# Patient Record
Sex: FEMALE | Race: BLACK OR AFRICAN AMERICAN | NOT HISPANIC OR LATINO | Employment: OTHER | URBAN - METROPOLITAN AREA
[De-identification: names, ages, dates, MRNs, and addresses within clinical notes are randomized per-mention and may not be internally consistent; named-entity substitution may affect disease eponyms.]

---

## 2017-04-21 ENCOUNTER — ALLSCRIPTS OFFICE VISIT (OUTPATIENT)
Dept: OTHER | Facility: OTHER | Age: 57
End: 2017-04-21

## 2017-04-21 DIAGNOSIS — M81.0 AGE-RELATED OSTEOPOROSIS WITHOUT CURRENT PATHOLOGICAL FRACTURE: ICD-10-CM

## 2017-04-21 DIAGNOSIS — I10 ESSENTIAL (PRIMARY) HYPERTENSION: ICD-10-CM

## 2017-04-21 DIAGNOSIS — Z12.39 ENCOUNTER FOR OTHER SCREENING FOR MALIGNANT NEOPLASM OF BREAST: ICD-10-CM

## 2017-04-21 DIAGNOSIS — M25.562 PAIN IN LEFT KNEE: ICD-10-CM

## 2017-04-21 DIAGNOSIS — E66.9 OBESITY: ICD-10-CM

## 2017-04-21 DIAGNOSIS — Z12.11 ENCOUNTER FOR SCREENING FOR MALIGNANT NEOPLASM OF COLON: ICD-10-CM

## 2017-04-21 DIAGNOSIS — R60.0 LOCALIZED EDEMA: ICD-10-CM

## 2017-04-21 DIAGNOSIS — R73.01 IMPAIRED FASTING GLUCOSE: ICD-10-CM

## 2017-04-24 ENCOUNTER — HOSPITAL ENCOUNTER (OUTPATIENT)
Dept: NON INVASIVE DIAGNOSTICS | Facility: CLINIC | Age: 57
Discharge: HOME | End: 2017-04-24
Payer: COMMERCIAL

## 2017-04-24 DIAGNOSIS — M25.562 PAIN IN LEFT KNEE: ICD-10-CM

## 2017-04-24 DIAGNOSIS — R60.0 LOCALIZED EDEMA: ICD-10-CM

## 2017-04-24 PROCEDURE — 93971 EXTREMITY STUDY: CPT

## 2017-04-26 ENCOUNTER — GENERIC CONVERSION - ENCOUNTER (OUTPATIENT)
Dept: OTHER | Facility: OTHER | Age: 57
End: 2017-04-26

## 2018-01-14 VITALS
HEART RATE: 96 BPM | WEIGHT: 250 LBS | HEIGHT: 66 IN | BODY MASS INDEX: 40.18 KG/M2 | DIASTOLIC BLOOD PRESSURE: 76 MMHG | OXYGEN SATURATION: 97 % | SYSTOLIC BLOOD PRESSURE: 132 MMHG

## 2018-02-27 ENCOUNTER — OFFICE VISIT (OUTPATIENT)
Dept: FAMILY MEDICINE CLINIC | Facility: CLINIC | Age: 58
End: 2018-02-27
Payer: COMMERCIAL

## 2018-02-27 VITALS
HEIGHT: 65 IN | DIASTOLIC BLOOD PRESSURE: 74 MMHG | OXYGEN SATURATION: 93 % | TEMPERATURE: 99.4 F | SYSTOLIC BLOOD PRESSURE: 140 MMHG | RESPIRATION RATE: 16 BRPM | WEIGHT: 242 LBS | BODY MASS INDEX: 40.32 KG/M2 | HEART RATE: 97 BPM

## 2018-02-27 DIAGNOSIS — Z12.31 ENCOUNTER FOR SCREENING MAMMOGRAM FOR BREAST CANCER: ICD-10-CM

## 2018-02-27 DIAGNOSIS — Z12.11 SCREENING FOR COLON CANCER: Primary | ICD-10-CM

## 2018-02-27 DIAGNOSIS — Z13.820 SCREENING FOR OSTEOPOROSIS: ICD-10-CM

## 2018-02-27 DIAGNOSIS — Z00.00 WELL ADULT EXAM: ICD-10-CM

## 2018-02-27 DIAGNOSIS — J01.10 ACUTE FRONTAL SINUSITIS, RECURRENCE NOT SPECIFIED: Primary | ICD-10-CM

## 2018-02-27 PROCEDURE — 99396 PREV VISIT EST AGE 40-64: CPT | Performed by: FAMILY MEDICINE

## 2018-02-27 RX ORDER — AMLODIPINE BESYLATE AND BENAZEPRIL HYDROCHLORIDE 5; 40 MG/1; MG/1
1 CAPSULE ORAL DAILY
COMMUNITY
Start: 2017-03-23 | End: 2018-05-05 | Stop reason: SDUPTHER

## 2018-02-27 RX ORDER — AZITHROMYCIN 250 MG/1
TABLET, FILM COATED ORAL
Qty: 6 TABLET | Refills: 0 | Status: SHIPPED | OUTPATIENT
Start: 2018-02-27 | End: 2018-03-03

## 2018-02-27 RX ORDER — HYDROCHLOROTHIAZIDE 12.5 MG/1
1 TABLET ORAL DAILY
COMMUNITY
Start: 2017-05-16 | End: 2018-05-25 | Stop reason: SDUPTHER

## 2018-02-27 NOTE — PROGRESS NOTES
Standard Visit   Assessment/Plan:     Visit Diagnosis:  Diagnoses and all orders for this visit:    Screening for colon cancer  -     Cologuard    Encounter for screening mammogram for breast cancer  -     Mammo screening bilateral w cad; Future    Screening for osteoporosis  -     DXA bone density spine hip and pelvis; Future    Well adult exam    Other orders  -     amLODIPine-benazepril (LOTREL) 5-40 MG per capsule; Take 1 capsule by mouth daily  -     hydrochlorothiazide (HYDRODIURIL) 12 5 mg tablet; Take 1 tablet by mouth Daily     physical form completed   Mammo and DEXA have been ordered  [place] guard has been ordered        Subjective:    Patient ID: Geraldo Slade is a 62 y o  female  Patient is here with the following concerns:    Here for a work physical   Jonathan Bernabe is here for a  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    this is a yearly state requirement   a PPD is no longer required   gyn appointment was about 4 years ago  mammo is overdue,  DEXA is overdue   no colonoscopy has been done, there is no colon cancer in her family   she is a candidate for the Cologuard    unsure of when her  Last Adacel shot was -  She does have babies under a year old in her         The following portions of the patient's history were reviewed and updated as appropriate: allergies, current medications, past family history, past medical history, past social history, past surgical history and problem list     Review of Systems   Constitutional: Negative for activity change, fatigue, fever and unexpected weight change  HENT: Negative for congestion, ear pain, hearing loss, sinus pain, sore throat, tinnitus, trouble swallowing and voice change  Eyes: Negative for pain, discharge and visual disturbance  Respiratory: Negative for cough, chest tightness, shortness of breath and wheezing  Cardiovascular: Negative for chest pain, palpitations and leg swelling     Gastrointestinal: Negative for abdominal pain, blood in stool, diarrhea and vomiting  Endocrine: Negative for cold intolerance, heat intolerance and polyuria  Genitourinary: Negative for difficulty urinating, dysuria, flank pain, genital sores and urgency  Musculoskeletal: Negative for gait problem, joint swelling and neck stiffness  Skin: Negative for color change, rash and wound  Allergic/Immunologic: Negative for immunocompromised state  Neurological: Negative for syncope, speech difficulty and light-headedness  Psychiatric/Behavioral: Negative for behavioral problems, dysphoric mood and suicidal ideas           /74   Pulse 97   Temp 99 4 °F (37 4 °C)   Resp 16   Ht 5' 5" (1 651 m)   Wt 110 kg (242 lb)   SpO2 93%   BMI 40 27 kg/m²   Social History     Social History    Marital status: Unknown     Spouse name: N/A    Number of children: N/A    Years of education: N/A     Occupational History    Self Employed      Social History Main Topics    Smoking status: Former Smoker    Smokeless tobacco: Never Used    Alcohol use No    Drug use: No    Sexual activity: Not on file     Other Topics Concern    Not on file     Social History Narrative    Employed    Lives alone with help available    Never uses seat belt    Self Employed     Past Medical History:   Diagnosis Date    Essential hypertension     Resolved:4/21/2017     Family History   Problem Relation Age of Onset    Cancer Father      malignant neoplasm    Diabetes Cousin     Asthma Other      acute    Hypertension Other     Cancer Other      malignant neoplasm    Cancer Other      malignant neoplasm    Thyroid disease Family      Past Surgical History:   Procedure Laterality Date    NO PAST SURGERIES         Current Outpatient Prescriptions:     amLODIPine-benazepril (LOTREL) 5-40 MG per capsule, Take 1 capsule by mouth daily, Disp: , Rfl:     hydrochlorothiazide (HYDRODIURIL) 12 5 mg tablet, Take 1 tablet by mouth Daily, Disp: , Rfl:       Objective: Physical Exam   Constitutional: She is oriented to person, place, and time  She appears well-developed and well-nourished  HENT:   Head: Normocephalic and atraumatic  Eyes: Pupils are equal, round, and reactive to light  Neck: Normal range of motion  Neck supple  Cardiovascular: Normal rate and normal heart sounds  No murmur heard  Pulmonary/Chest: Effort normal and breath sounds normal  No respiratory distress  She exhibits no tenderness  Abdominal: Soft  Bowel sounds are normal    Musculoskeletal: Normal range of motion  She exhibits no tenderness  Lymphadenopathy:     She has no cervical adenopathy  Neurological: She is alert and oriented to person, place, and time  Coordination normal    Skin: Skin is warm and dry  Psychiatric: She has a normal mood and affect  Thought content normal    Nursing note and vitals reviewed        Social History     Social History    Marital status: Unknown     Spouse name: N/A    Number of children: N/A    Years of education: N/A     Occupational History    Self Employed      Social History Main Topics    Smoking status: Former Smoker    Smokeless tobacco: Never Used    Alcohol use No    Drug use: No    Sexual activity: Not on file     Other Topics Concern    Not on file     Social History Narrative    Employed    Lives alone with help available    Never uses seat belt    Self Employed     Past Medical History:   Diagnosis Date    Essential hypertension     Resolved:4/21/2017       Current Outpatient Prescriptions:     amLODIPine-benazepril (LOTREL) 5-40 MG per capsule, Take 1 capsule by mouth daily, Disp: , Rfl:     hydrochlorothiazide (HYDRODIURIL) 12 5 mg tablet, Take 1 tablet by mouth Daily, Disp: , Rfl:   Family History   Problem Relation Age of Onset    Cancer Father      malignant neoplasm    Diabetes Cousin     Asthma Other      acute    Hypertension Other     Cancer Other      malignant neoplasm    Cancer Other      malignant neoplasm  Thyroid disease Family        There are no Patient Instructions on file for this visit          CLEMENTINE Holliday

## 2018-03-19 ENCOUNTER — TELEPHONE (OUTPATIENT)
Dept: FAMILY MEDICINE CLINIC | Facility: CLINIC | Age: 58
End: 2018-03-19

## 2018-03-19 NOTE — TELEPHONE ENCOUNTER
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

## 2018-03-26 NOTE — TELEPHONE ENCOUNTER
Pt called back  States she did not go to an urgent care  She took a lot of Cranberry juice and her UTI symptoms improved on their own  She still have sinus pain but is getting better  I advised pt to call back to schedule an appt if her UTI symptoms comes back  She agreed

## 2018-05-05 DIAGNOSIS — I10 ESSENTIAL HYPERTENSION: Primary | ICD-10-CM

## 2018-05-05 RX ORDER — AMLODIPINE BESYLATE AND BENAZEPRIL HYDROCHLORIDE 5; 40 MG/1; MG/1
CAPSULE ORAL
Qty: 90 CAPSULE | Refills: 1 | Status: SHIPPED | OUTPATIENT
Start: 2018-05-05 | End: 2018-11-27 | Stop reason: SDUPTHER

## 2018-05-25 DIAGNOSIS — R60.9 EDEMA, UNSPECIFIED TYPE: Primary | ICD-10-CM

## 2018-05-25 RX ORDER — HYDROCHLOROTHIAZIDE 12.5 MG/1
TABLET ORAL
Qty: 90 TABLET | Refills: 1 | Status: SHIPPED | OUTPATIENT
Start: 2018-05-25 | End: 2018-12-18 | Stop reason: SDUPTHER

## 2018-11-22 DIAGNOSIS — I10 ESSENTIAL HYPERTENSION: ICD-10-CM

## 2018-11-22 RX ORDER — AMLODIPINE BESYLATE AND BENAZEPRIL HYDROCHLORIDE 5; 40 MG/1; MG/1
CAPSULE ORAL
Qty: 90 CAPSULE | Refills: 1 | Status: CANCELLED | OUTPATIENT
Start: 2018-11-22

## 2018-11-27 DIAGNOSIS — I10 ESSENTIAL HYPERTENSION: ICD-10-CM

## 2018-11-27 RX ORDER — AMLODIPINE BESYLATE AND BENAZEPRIL HYDROCHLORIDE 5; 40 MG/1; MG/1
1 CAPSULE ORAL DAILY
Qty: 90 CAPSULE | Refills: 1 | Status: SHIPPED | OUTPATIENT
Start: 2018-11-27 | End: 2019-06-03 | Stop reason: SDUPTHER

## 2018-12-17 DIAGNOSIS — R60.9 EDEMA, UNSPECIFIED TYPE: ICD-10-CM

## 2018-12-18 RX ORDER — HYDROCHLOROTHIAZIDE 12.5 MG/1
TABLET ORAL
Qty: 90 TABLET | Refills: 1 | Status: SHIPPED | OUTPATIENT
Start: 2018-12-18 | End: 2019-06-14 | Stop reason: SDUPTHER

## 2019-03-06 ENCOUNTER — OFFICE VISIT (OUTPATIENT)
Dept: FAMILY MEDICINE CLINIC | Facility: CLINIC | Age: 59
End: 2019-03-06
Payer: COMMERCIAL

## 2019-03-06 VITALS
DIASTOLIC BLOOD PRESSURE: 74 MMHG | WEIGHT: 242 LBS | HEIGHT: 66 IN | BODY MASS INDEX: 38.89 KG/M2 | SYSTOLIC BLOOD PRESSURE: 128 MMHG | OXYGEN SATURATION: 99 % | HEART RATE: 76 BPM

## 2019-03-06 DIAGNOSIS — Z12.31 ENCOUNTER FOR SCREENING MAMMOGRAM FOR BREAST CANCER: ICD-10-CM

## 2019-03-06 DIAGNOSIS — Z12.11 SCREENING FOR COLON CANCER: Primary | ICD-10-CM

## 2019-03-06 DIAGNOSIS — I10 ESSENTIAL HYPERTENSION: ICD-10-CM

## 2019-03-06 DIAGNOSIS — R53.82 CHRONIC FATIGUE: ICD-10-CM

## 2019-03-06 DIAGNOSIS — E78.2 MIXED HYPERLIPIDEMIA: ICD-10-CM

## 2019-03-06 DIAGNOSIS — R63.5 WEIGHT GAIN: ICD-10-CM

## 2019-03-06 DIAGNOSIS — M81.0 AGE-RELATED OSTEOPOROSIS WITHOUT CURRENT PATHOLOGICAL FRACTURE: ICD-10-CM

## 2019-03-06 DIAGNOSIS — Z11.59 NEED FOR HEPATITIS C SCREENING TEST: ICD-10-CM

## 2019-03-06 PROCEDURE — 99396 PREV VISIT EST AGE 40-64: CPT | Performed by: FAMILY MEDICINE

## 2019-03-06 NOTE — PROGRESS NOTES
Standard Visit   Assessment/Plan:     Visit Diagnosis:  Diagnoses and all orders for this visit:    Screening for colon cancer  -     Cologuard; Future    Essential hypertension  -     Microalbumin / creatinine urine ratio  -     TSH, 3rd generation with Free T4 reflex; Future    Mixed hyperlipidemia  -     Lipid panel; Future    Chronic fatigue  -     TSH, 3rd generation with Free T4 reflex; Future  -     Comprehensive metabolic panel; Future  -     CBC and differential; Future    Need for hepatitis C screening test  -     Hepatitis C antibody; Future    Encounter for screening mammogram for breast cancer  -     Mammo screening bilateral w cad; Future    Age-related osteoporosis without current pathological fracture  -     DXA bone density spine hip and pelvis; Future    Weight gain  -     Lorcaserin HCl 10 MG TABS; Take 1 tablet (10 mg total) by mouth once for 1 dose          Subjective:    Patient ID: Deborah Gee is a 62 y o  female  Patient is here with the following concerns:    Marly Felder is here for her  physical  She is treated for hypertension with Lotrel and hydrochlorothiazide  She is overdue for her lab work and mammo and DEXA  She is also overdue for her colon guard test which we will order today  She is also keen on some weight loss  She did very well on Belviq  So we are going to talk about some of our her options today        The following portions of the patient's history were reviewed and updated as appropriate: allergies, current medications, past family history, past medical history, past social history, past surgical history and problem list     Review of Systems   Constitutional: Positive for activity change and unexpected weight change  Negative for fever  HENT: Negative for nosebleeds and trouble swallowing  Eyes: Negative  Respiratory: Negative  Cardiovascular: Negative for palpitations  Gastrointestinal: Negative for abdominal pain, blood in stool and vomiting  Endocrine: Negative for cold intolerance  Genitourinary: Negative  Musculoskeletal: Negative for neck stiffness  Skin: Negative for pallor  Allergic/Immunologic: Negative for immunocompromised state  Neurological: Negative for seizures and facial asymmetry  Hematological: Negative for adenopathy  Psychiatric/Behavioral: Negative for agitation and suicidal ideas           /74   Pulse 76   Ht 5' 6" (1 676 m)   Wt 110 kg (242 lb)   SpO2 99%   BMI 39 06 kg/m²   Social History     Socioeconomic History    Marital status: Unknown     Spouse name: Not on file    Number of children: Not on file    Years of education: Not on file    Highest education level: Not on file   Occupational History    Occupation: Self Employed   Social Needs    Financial resource strain: Not on file    Food insecurity:     Worry: Not on file     Inability: Not on file    Transportation needs:     Medical: Not on file     Non-medical: Not on file   Tobacco Use    Smoking status: Former Smoker    Smokeless tobacco: Never Used   Substance and Sexual Activity    Alcohol use: No    Drug use: No    Sexual activity: Not on file   Lifestyle    Physical activity:     Days per week: Not on file     Minutes per session: Not on file    Stress: Not on file   Relationships    Social connections:     Talks on phone: Not on file     Gets together: Not on file     Attends Sikhism service: Not on file     Active member of club or organization: Not on file     Attends meetings of clubs or organizations: Not on file     Relationship status: Not on file    Intimate partner violence:     Fear of current or ex partner: Not on file     Emotionally abused: Not on file     Physically abused: Not on file     Forced sexual activity: Not on file   Other Topics Concern    Not on file   Social History Narrative    Employed    Lives alone with help available    Never uses seat belt    Self Employed     Past Medical History: Diagnosis Date    Essential hypertension     Resolved:4/21/2017     Family History   Problem Relation Age of Onset    Cancer Father         malignant neoplasm    Diabetes Cousin     Asthma Other         acute    Hypertension Other     Cancer Other         malignant neoplasm    Cancer Other         malignant neoplasm    Thyroid disease Family      Past Surgical History:   Procedure Laterality Date    NO PAST SURGERIES         Current Outpatient Medications:     amLODIPine-benazepril (LOTREL) 5-40 MG per capsule, Take 1 capsule by mouth daily, Disp: 90 capsule, Rfl: 1    hydrochlorothiazide (HYDRODIURIL) 12 5 mg tablet, TAKE 1 TABLET BY MOUTH EVERY DAY IN THE MORNING, Disp: 90 tablet, Rfl: 1    Lorcaserin HCl 10 MG TABS, Take 1 tablet (10 mg total) by mouth once for 1 dose, Disp: 30 tablet, Rfl: 3      Objective:     Physical Exam   Constitutional: She is oriented to person, place, and time  She appears well-developed and well-nourished  HENT:   Head: Normocephalic and atraumatic  Eyes: Pupils are equal, round, and reactive to light  Neck: Normal range of motion  Neck supple  Cardiovascular: Normal rate  Pulmonary/Chest: Effort normal and breath sounds normal    Abdominal: Soft  Musculoskeletal: Normal range of motion  Neurological: She is alert and oriented to person, place, and time  Skin: Skin is warm and dry  Psychiatric: She has a normal mood and affect  Nursing note and vitals reviewed        Social History     Socioeconomic History    Marital status: Unknown     Spouse name: Not on file    Number of children: Not on file    Years of education: Not on file    Highest education level: Not on file   Occupational History    Occupation: Self Employed   Social Needs    Financial resource strain: Not on file    Food insecurity:     Worry: Not on file     Inability: Not on file    Transportation needs:     Medical: Not on file     Non-medical: Not on file   Tobacco Use    Smoking status: Former Smoker    Smokeless tobacco: Never Used   Substance and Sexual Activity    Alcohol use: No    Drug use: No    Sexual activity: Not on file   Lifestyle    Physical activity:     Days per week: Not on file     Minutes per session: Not on file    Stress: Not on file   Relationships    Social connections:     Talks on phone: Not on file     Gets together: Not on file     Attends Confucianist service: Not on file     Active member of club or organization: Not on file     Attends meetings of clubs or organizations: Not on file     Relationship status: Not on file    Intimate partner violence:     Fear of current or ex partner: Not on file     Emotionally abused: Not on file     Physically abused: Not on file     Forced sexual activity: Not on file   Other Topics Concern    Not on file   Social History Narrative    Employed    Lives alone with help available    Never uses seat belt    Self Employed     Past Medical History:   Diagnosis Date    Essential hypertension     Resolved:4/21/2017       Current Outpatient Medications:     amLODIPine-benazepril (LOTREL) 5-40 MG per capsule, Take 1 capsule by mouth daily, Disp: 90 capsule, Rfl: 1    hydrochlorothiazide (HYDRODIURIL) 12 5 mg tablet, TAKE 1 TABLET BY MOUTH EVERY DAY IN THE MORNING, Disp: 90 tablet, Rfl: 1    Lorcaserin HCl 10 MG TABS, Take 1 tablet (10 mg total) by mouth once for 1 dose, Disp: 30 tablet, Rfl: 3  Family History   Problem Relation Age of Onset    Cancer Father         malignant neoplasm    Diabetes Cousin     Asthma Other         acute    Hypertension Other     Cancer Other         malignant neoplasm    Cancer Other         malignant neoplasm    Thyroid disease Family        There are no Patient Instructions on file for this visit          CLEMENTINE Durand

## 2019-03-08 ENCOUNTER — TELEPHONE (OUTPATIENT)
Dept: FAMILY MEDICINE CLINIC | Facility: CLINIC | Age: 59
End: 2019-03-08

## 2019-03-08 DIAGNOSIS — R63.5 WEIGHT GAIN: ICD-10-CM

## 2019-03-08 NOTE — TELEPHONE ENCOUNTER
Script was accidentally printed  It can be shredded  I did forward it on to her pharmacy  Please give her a call

## 2019-03-11 NOTE — TELEPHONE ENCOUNTER
Called and spoke with pt, informed her that Rx was sent in again with the correction but she is having some insurance issue covering the medication  She stated that she will contact the office back with up dates  5

## 2019-03-17 ENCOUNTER — HOSPITAL ENCOUNTER (EMERGENCY)
Facility: HOSPITAL | Age: 59
Discharge: HOME | End: 2019-03-17
Attending: EMERGENCY MEDICINE | Admitting: EMERGENCY MEDICINE
Payer: COMMERCIAL

## 2019-03-17 ENCOUNTER — APPOINTMENT (EMERGENCY)
Dept: CT IMAGING | Facility: HOSPITAL | Age: 59
End: 2019-03-17
Payer: COMMERCIAL

## 2019-03-17 VITALS
HEART RATE: 82 BPM | DIASTOLIC BLOOD PRESSURE: 66 MMHG | OXYGEN SATURATION: 97 % | WEIGHT: 250 LBS | TEMPERATURE: 97.9 F | BODY MASS INDEX: 40.35 KG/M2 | SYSTOLIC BLOOD PRESSURE: 128 MMHG | RESPIRATION RATE: 20 BRPM

## 2019-03-17 DIAGNOSIS — N20.1 RIGHT URETERAL STONE: ICD-10-CM

## 2019-03-17 DIAGNOSIS — R10.9 RIGHT FLANK PAIN: Primary | ICD-10-CM

## 2019-03-17 LAB
ALBUMIN SERPL BCP-MCNC: 3.3 G/DL (ref 3.5–5)
ALP SERPL-CCNC: 73 U/L (ref 46–116)
ALT SERPL W P-5'-P-CCNC: 23 U/L (ref 12–78)
ANION GAP SERPL CALCULATED.3IONS-SCNC: 7 MMOL/L (ref 4–13)
AST SERPL W P-5'-P-CCNC: 13 U/L (ref 5–45)
BACTERIA UR QL AUTO: ABNORMAL /HPF
BASOPHILS # BLD AUTO: 0.03 THOUSANDS/ΜL (ref 0–0.1)
BASOPHILS NFR BLD AUTO: 0 % (ref 0–1)
BILIRUB DIRECT SERPL-MCNC: 0.07 MG/DL (ref 0–0.2)
BILIRUB SERPL-MCNC: 0.3 MG/DL (ref 0.2–1)
BILIRUB UR QL STRIP: NEGATIVE
BUN SERPL-MCNC: 11 MG/DL (ref 5–25)
CALCIUM SERPL-MCNC: 9.1 MG/DL (ref 8.3–10.1)
CHLORIDE SERPL-SCNC: 103 MMOL/L (ref 100–108)
CLARITY UR: CLEAR
CO2 SERPL-SCNC: 29 MMOL/L (ref 21–32)
COLOR UR: YELLOW
CREAT SERPL-MCNC: 0.87 MG/DL (ref 0.6–1.3)
EOSINOPHIL # BLD AUTO: 0.15 THOUSAND/ΜL (ref 0–0.61)
EOSINOPHIL NFR BLD AUTO: 2 % (ref 0–6)
ERYTHROCYTE [DISTWIDTH] IN BLOOD BY AUTOMATED COUNT: 13.1 % (ref 11.6–15.1)
GFR SERPL CREATININE-BSD FRML MDRD: 85 ML/MIN/1.73SQ M
GLUCOSE SERPL-MCNC: 155 MG/DL (ref 65–140)
GLUCOSE UR STRIP-MCNC: NEGATIVE MG/DL
HCT VFR BLD AUTO: 40.1 % (ref 34.8–46.1)
HGB BLD-MCNC: 13 G/DL (ref 11.5–15.4)
HGB UR QL STRIP.AUTO: NEGATIVE
IMM GRANULOCYTES # BLD AUTO: 0.03 THOUSAND/UL (ref 0–0.2)
IMM GRANULOCYTES NFR BLD AUTO: 0 % (ref 0–2)
KETONES UR STRIP-MCNC: NEGATIVE MG/DL
LEUKOCYTE ESTERASE UR QL STRIP: NEGATIVE
LIPASE SERPL-CCNC: 94 U/L (ref 73–393)
LYMPHOCYTES # BLD AUTO: 2.6 THOUSANDS/ΜL (ref 0.6–4.47)
LYMPHOCYTES NFR BLD AUTO: 37 % (ref 14–44)
MCH RBC QN AUTO: 29.7 PG (ref 26.8–34.3)
MCHC RBC AUTO-ENTMCNC: 32.4 G/DL (ref 31.4–37.4)
MCV RBC AUTO: 92 FL (ref 82–98)
MONOCYTES # BLD AUTO: 0.46 THOUSAND/ΜL (ref 0.17–1.22)
MONOCYTES NFR BLD AUTO: 7 % (ref 4–12)
NEUTROPHILS # BLD AUTO: 3.83 THOUSANDS/ΜL (ref 1.85–7.62)
NEUTS SEG NFR BLD AUTO: 54 % (ref 43–75)
NITRITE UR QL STRIP: POSITIVE
NON-SQ EPI CELLS URNS QL MICRO: ABNORMAL /HPF
NRBC BLD AUTO-RTO: 0 /100 WBCS
PH UR STRIP.AUTO: 5.5 [PH]
PLATELET # BLD AUTO: 287 THOUSANDS/UL (ref 149–390)
PMV BLD AUTO: 9.9 FL (ref 8.9–12.7)
POTASSIUM SERPL-SCNC: 3.5 MMOL/L (ref 3.5–5.3)
PROT SERPL-MCNC: 7.7 G/DL (ref 6.4–8.2)
PROT UR STRIP-MCNC: NEGATIVE MG/DL
RBC # BLD AUTO: 4.37 MILLION/UL (ref 3.81–5.12)
RBC #/AREA URNS AUTO: ABNORMAL /HPF
SODIUM SERPL-SCNC: 139 MMOL/L (ref 136–145)
SP GR UR STRIP.AUTO: >=1.03 (ref 1–1.03)
UROBILINOGEN UR QL STRIP.AUTO: 0.2 E.U./DL
WBC # BLD AUTO: 7.1 THOUSAND/UL (ref 4.31–10.16)
WBC #/AREA URNS AUTO: ABNORMAL /HPF

## 2019-03-17 PROCEDURE — 87086 URINE CULTURE/COLONY COUNT: CPT | Performed by: EMERGENCY MEDICINE

## 2019-03-17 PROCEDURE — 96374 THER/PROPH/DIAG INJ IV PUSH: CPT

## 2019-03-17 PROCEDURE — 81001 URINALYSIS AUTO W/SCOPE: CPT | Performed by: EMERGENCY MEDICINE

## 2019-03-17 PROCEDURE — 85025 COMPLETE CBC W/AUTO DIFF WBC: CPT | Performed by: EMERGENCY MEDICINE

## 2019-03-17 PROCEDURE — 36415 COLL VENOUS BLD VENIPUNCTURE: CPT | Performed by: EMERGENCY MEDICINE

## 2019-03-17 PROCEDURE — 71275 CT ANGIOGRAPHY CHEST: CPT

## 2019-03-17 PROCEDURE — 80048 BASIC METABOLIC PNL TOTAL CA: CPT | Performed by: EMERGENCY MEDICINE

## 2019-03-17 PROCEDURE — 87077 CULTURE AEROBIC IDENTIFY: CPT | Performed by: EMERGENCY MEDICINE

## 2019-03-17 PROCEDURE — 87186 SC STD MICRODIL/AGAR DIL: CPT | Performed by: EMERGENCY MEDICINE

## 2019-03-17 PROCEDURE — 83690 ASSAY OF LIPASE: CPT | Performed by: EMERGENCY MEDICINE

## 2019-03-17 PROCEDURE — 96361 HYDRATE IV INFUSION ADD-ON: CPT

## 2019-03-17 PROCEDURE — 99284 EMERGENCY DEPT VISIT MOD MDM: CPT

## 2019-03-17 PROCEDURE — 74177 CT ABD & PELVIS W/CONTRAST: CPT

## 2019-03-17 PROCEDURE — 80076 HEPATIC FUNCTION PANEL: CPT | Performed by: EMERGENCY MEDICINE

## 2019-03-17 RX ORDER — CEPHALEXIN 500 MG/1
500 CAPSULE ORAL EVERY 8 HOURS SCHEDULED
Qty: 21 CAPSULE | Refills: 0 | Status: SHIPPED | OUTPATIENT
Start: 2019-03-17 | End: 2019-03-24

## 2019-03-17 RX ORDER — TAMSULOSIN HYDROCHLORIDE 0.4 MG/1
0.4 CAPSULE ORAL
Qty: 7 CAPSULE | Refills: 0 | Status: SHIPPED | OUTPATIENT
Start: 2019-03-17 | End: 2020-05-08

## 2019-03-17 RX ORDER — NAPROXEN 500 MG/1
500 TABLET ORAL EVERY 12 HOURS PRN
Qty: 20 TABLET | Refills: 0 | Status: SHIPPED | OUTPATIENT
Start: 2019-03-17 | End: 2020-05-08

## 2019-03-17 RX ORDER — OXYCODONE HYDROCHLORIDE AND ACETAMINOPHEN 5; 325 MG/1; MG/1
1 TABLET ORAL EVERY 4 HOURS PRN
Qty: 12 TABLET | Refills: 0 | Status: SHIPPED | OUTPATIENT
Start: 2019-03-17 | End: 2019-03-27

## 2019-03-17 RX ORDER — KETOROLAC TROMETHAMINE 30 MG/ML
15 INJECTION, SOLUTION INTRAMUSCULAR; INTRAVENOUS ONCE
Status: COMPLETED | OUTPATIENT
Start: 2019-03-17 | End: 2019-03-17

## 2019-03-17 RX ORDER — LIDOCAINE 50 MG/G
1 PATCH TOPICAL ONCE
Status: DISCONTINUED | OUTPATIENT
Start: 2019-03-17 | End: 2019-03-17 | Stop reason: HOSPADM

## 2019-03-17 RX ADMIN — IOHEXOL 100 ML: 350 INJECTION, SOLUTION INTRAVENOUS at 13:19

## 2019-03-17 RX ADMIN — LIDOCAINE 1 PATCH: 50 PATCH TOPICAL at 11:52

## 2019-03-17 RX ADMIN — KETOROLAC TROMETHAMINE 15 MG: 30 INJECTION, SOLUTION INTRAMUSCULAR at 11:53

## 2019-03-17 RX ADMIN — SODIUM CHLORIDE 1000 ML: 0.9 INJECTION, SOLUTION INTRAVENOUS at 11:51

## 2019-03-17 NOTE — ED NOTES
Discharge instructions reviewed with patient  Pt verbalized understanding and denied any questions at this time  Pt given supplies to collect kidney stone       Bakari Chung RN  03/17/19 [id number]

## 2019-03-17 NOTE — DISCHARGE INSTRUCTIONS
Ureteral Stones   WHAT YOU NEED TO KNOW:   A ureteral stone is a stone that forms in the kidney and moves down the ureter and gets stuck there  The ureter is the tube that takes urine from the kidney to the bladder  Stones can form in the urinary system when your urine has high levels of minerals and salts  Urinary stones can be made of uric acid, calcium, phosphate, or oxalate crystals  DISCHARGE INSTRUCTIONS:   Seek care immediately if:   · You have severe pain that does not improve, even after you take medicine  · You have vomiting that is not relieved by medicine  Contact your healthcare provider if:   · You develop a fever  · You have any questions or concerns about your condition or care  Follow up with your healthcare provider as directed: You may need to return for more tests  Write down your questions so you remember to ask them during your visits  Medicines: You may need any of the following:  · NSAIDs , such as ibuprofen, help decrease swelling, pain, and fever  This medicine is available with or without a doctor's order  NSAIDs can cause stomach bleeding or kidney problems in certain people  If you take blood thinner medicine, always ask your healthcare provider if NSAIDs are safe for you  Always read the medicine label and follow directions  · Prescription pain medicine  may help decrease pain or help your ureteral stone pass  Do not wait until the pain is severe before you take pain medicine  · Nausea medicine  may help calm your stomach and prevent vomiting  · Take your medicine as directed  Contact your healthcare provider if you think your medicine is not helping or if you have side effects  Tell him or her if you are allergic to any medicine  Keep a list of the medicines, vitamins, and herbs you take  Include the amounts, and when and why you take them  Bring the list or the pill bottles to follow-up visits   Carry your medicine list with you in case of an emergency  Self-care:   · Drink plenty of liquids  Your healthcare provider may tell you to drink at least 8 to 12 (eight-ounce) cups of liquids each day  This helps flush out the ureteral stones when you urinate  Water is the best liquid to drink  · Strain your urine every time you go to the bathroom  Urinate through a strainer or a piece of thin cloth to catch the stones  Take the stones to your healthcare provider so they can be sent to the lab for tests  This will help your healthcare providers plan the best treatment for you  · Ask your healthcare provider about any nutrition changes you need to make  You may need to limit certain foods such as foods high in sodium (salt), certain protein foods, or foods high in oxalate  After you pass your ureteral stone: Once you have passed your ureteral stone, you may need to do a 24-hour urine test  You may need to save all of your urine for 24 hours  Each time you go to the bathroom, you will urinate into a container  Then you will pour your urine into a larger container that is kept cold  You may be told to write down the time and amount of urine you passed  At the end of 24 hours, the urine is sent to a lab for tests  Results from the test will help your healthcare provider plan ways to prevent more stones from forming  © 2017 [facility] Information is for End User's use only and may not be sold, redistributed or otherwise used for commercial purposes  All illustrations and images included in CareNotes® are the copyrighted property of A D [employer] M [employer]  or [employer]  The above information is an  only  It is not intended as medical advice for individual conditions or treatments  Talk to your doctor, nurse or pharmacist before following any medical regimen to see if it is safe and effective for you        How to Strain Your Urine   WHAT YOU NEED TO KNOW:   Urinate into a strainer (funnel with a fine mesh on the bottom) or glass jar to collect kidney stones  DISCHARGE INSTRUCTIONS:   Medicines:   · Pain medicine: You may be given medicine to take away or decrease pain  Do not wait until the pain is severe before you take your medicine  · NSAIDs:  These medicines decrease swelling, pain, and fever  NSAIDs are available without a doctor's order  Ask which medicine is right for you  Ask how much to take and when to take it  Take as directed  NSAIDs can cause stomach bleeding and kidney problems if not taken correctly  · Nausea medicine: This medicine calms your stomach and prevents or controls vomiting  · Take your medicine as directed  Contact your healthcare provider if you think your medicine is not helping or if you have side effects  Tell him of her if you are allergic to any medicine  Keep a list of the medicines, vitamins, and herbs you take  Include the amounts, and when and why you take them  Bring the list or the pill bottles to follow-up visits  Carry your medicine list with you in case of an emergency  Drink liquids as directed:  Drink about 3 liters of liquids each day, or as directed  That equals about 12 glasses of water or fruit juice  Half of your total daily liquids should be water  Limit coffee, tea, and soda to 2 cups daily  Your urine will be pale and clear if you are drinking enough liquid  Self-care:   · Activity:  Exercise, such as walking, may help decrease your pain  · Avoid heat:  Heat may cause you to sweat, urinate less, and become dehydrated  Follow up with your healthcare provider or urologist as directed:  Write down your questions so you remember to ask them during your visits  Contact your healthcare provider or urologist if:   · You have a fever and chills  · Your urine looks cloudy or has a bad smell  · You have burning pain when you urinate  · You have trouble urinating      · You are vomiting and it does not get better, even after you take medicine  · You have questions or concerns about your condition or care  Return to the emergency department if:   · You are not able to urinate  · You have severe pain in your lower abdomen or side  · Your heart flutters or beats faster than usual   © 2017 [employer] [facility] [employer] Information is for End User's use only and may not be sold, redistributed or otherwise used for commercial purposes  All illustrations and images included in CareNotes® are the copyrighted property of [employer] D [employer]  or [employer]en [employer]  The above information is an  only  It is not intended as medical advice for individual conditions or treatments  Talk to your doctor, nurse or pharmacist before following any medical regimen to see if it is safe and effective for you

## 2019-03-17 NOTE — ED PROVIDER NOTES
History  Chief Complaint   Patient presents with    Flank Pain     Pt  reports right side pain that radiates into back x2 weeks  Pt  reports worsening with movement  Patient is a 60-year-old female with past medical history of hypertension, surgical history of tubal ligation and D and C, presents to the emergency department complaining of right flank pain for the past 2 weeks  Patient reports she has had pain in the right side and right flank region it is been fairly constant and started about 2 weeks ago  The pain initially was mild but does wax and wane in severity  She describes it as both a sharp pain and sometimes a constant ache  She states it is worsened when she takes a deep breath and also with certain movements especially picking up heavy objects  She denies any recent injury, fall, MVC or picking something up and having acute pain at that time  She denies ever having this type of pain before  She has tried taking ibuprofen which does alleviate the pain but it returns  She also tried icy Hot  She denies any associated symptoms  No fever, shaking chills, headaches, dizziness or near syncope, visual disturbance or eye pain, recent URI symptoms, cough or hemoptysis, chest pain, palpitations, dyspnea, wheezing, abdominal pain, nausea, vomiting, diarrhea, constipation, blood per rectum or melena, dysuria, change in urinary frequency, hematuria, skin rash or color change, extremity weakness or paresthesia or other focal neurologic deficits  History provided by:  Patient   used: No    Flank Pain   Associated symptoms: no chest pain, no chills, no constipation, no cough, no diarrhea, no dysuria, no fever, no hematuria, no nausea, no shortness of breath, no sore throat and no vomiting        Prior to Admission Medications   Prescriptions Last Dose Informant Patient Reported? Taking?    amLODIPine-benazepril (LOTREL) 5-40 MG per capsule   No No   Sig: Take 1 capsule by mouth daily   hydrochlorothiazide (HYDRODIURIL) 12 5 mg tablet   No No   Sig: TAKE 1 TABLET BY MOUTH EVERY DAY IN THE MORNING      Facility-Administered Medications: None       Past Medical History:   Diagnosis Date    Essential hypertension     Resolved:4/21/2017       Past Surgical History:   Procedure Laterality Date    DILATION AND CURETTAGE OF UTERUS      NO PAST SURGERIES      TUBAL LIGATION         Family History   Problem Relation Age of Onset    Cancer Father         malignant neoplasm    Diabetes Cousin     Asthma Other         acute    Hypertension Other     Cancer Other         malignant neoplasm    Cancer Other         malignant neoplasm    Thyroid disease Family      I have reviewed and agree with the history as documented  Social History     Tobacco Use    Smoking status: Former Smoker    Smokeless tobacco: Never Used   Substance Use Topics    Alcohol use: No    Drug use: No        Review of Systems   Constitutional: Negative for chills and fever  HENT: Negative for congestion, ear pain, rhinorrhea and sore throat  Eyes: Negative for pain and visual disturbance  Respiratory: Negative for cough, chest tightness, shortness of breath and wheezing  Cardiovascular: Negative for chest pain and palpitations  Gastrointestinal: Negative for abdominal distention, abdominal pain, blood in stool, constipation, diarrhea, nausea and vomiting  Genitourinary: Positive for flank pain  Negative for dysuria, frequency, hematuria and pelvic pain  Musculoskeletal: Negative for back pain, neck pain and neck stiffness  Skin: Negative for color change, pallor and rash  Allergic/Immunologic: Negative for immunocompromised state  Neurological: Negative for dizziness, syncope, weakness, light-headedness, numbness and headaches  Hematological: Negative for adenopathy  Psychiatric/Behavioral: Negative for confusion and decreased concentration     All other systems reviewed and are negative  Physical Exam  Physical Exam   Constitutional: She is oriented to person, place, and time  She appears well-developed and well-nourished  No distress  HENT:   Head: Normocephalic and atraumatic  Mouth/Throat: Oropharynx is clear and moist  No oropharyngeal exudate  Eyes: Pupils are equal, round, and reactive to light  Conjunctivae and EOM are normal    Neck: Normal range of motion  Neck supple  No JVD present  Cardiovascular: Normal rate, regular rhythm, normal heart sounds and intact distal pulses  Exam reveals no gallop and no friction rub  No murmur heard  Pulmonary/Chest: Effort normal and breath sounds normal  No respiratory distress  She has no wheezes  She has no rales  She exhibits no tenderness  Abdominal: Soft  Bowel sounds are normal  She exhibits no distension  There is no tenderness  There is no rebound and no guarding  +Right CVA tenderness  Musculoskeletal: Normal range of motion  She exhibits no edema or tenderness  No midline spine tenderness  Neurological: She is alert and oriented to person, place, and time  No gross motor or sensory deficits  5/5 strength throughout  Skin: Skin is warm and dry  No rash noted  She is not diaphoretic  No erythema  No pallor  Psychiatric: She has a normal mood and affect  Her behavior is normal    Nursing note and vitals reviewed        Vital Signs  ED Triage Vitals [03/17/19 1106]   Temperature Pulse Respirations Blood Pressure SpO2   97 9 °F (36 6 °C) 89 18 129/74 97 %      Temp Source Heart Rate Source Patient Position - Orthostatic VS BP Location FiO2 (%)   Oral Monitor Sitting Right arm --      Pain Score       5         Vitals:    03/17/19 1300 03/17/19 1330 03/17/19 1400 03/17/19 1430   BP: 116/67 124/69 134/67 128/66   BP Location: Left arm Left arm Left arm Left arm   Pulse: 80 78 78 82   Resp: 16 18 18 20   Temp:       TempSrc:       SpO2: 94% 98% 100% 97%   Weight:           qSOFA     Row Name 03/17/19 1430 03/17/19 1400 03/17/19 1330 03/17/19 1300 03/17/19 1230    Altered mental status GCS < 15  --  --  --  --  --    Respiratory Rate > / =22  0  0  0  0  0    Systolic BP < / =680  0  0  0  0  0    Q Sofa Score  0  0  0  0  0    Row Name 03/17/19 1200 03/17/19 1106             Altered mental status GCS < 15  --  --       Respiratory Rate > / =22  0  0       Systolic BP < / =723  0  0       Q Sofa Score  0  0             Visual Acuity      ED Medications  Medications   lidocaine (LIDODERM) 5 % patch 1 patch (1 patch Topical Medication Applied 3/17/19 1152)   sodium chloride 0 9 % bolus 1,000 mL (1,000 mL Intravenous New Bag 3/17/19 1151)   ketorolac (TORADOL) injection 15 mg (15 mg Intravenous Given 3/17/19 1153)   iohexol (OMNIPAQUE) 350 MG/ML injection (MULTI-DOSE) 100 mL (100 mL Intravenous Given 3/17/19 1319)       Diagnostic Studies  Results Reviewed     Procedure Component Value Units Date/Time    Urine culture [[id number]] Collected:  03/17/19 1428    Lab Status: In process Specimen:  Urine Updated:  03/17/19 8469    Basic metabolic panel [[id number]]  (Abnormal) Collected:  03/17/19 1150    Lab Status:  Final result Specimen:  Blood from Arm, Right Updated:  03/17/19 1215     Sodium 139 mmol/L      Potassium 3 5 mmol/L      Chloride 103 mmol/L      CO2 29 mmol/L      ANION GAP 7 mmol/L      BUN 11 mg/dL      Creatinine 0 87 mg/dL      Glucose 155 mg/dL      Calcium 9 1 mg/dL      eGFR 85 ml/min/1 73sq m     Narrative:       National Kidney Disease Education Program recommendations are as follows:  GFR calculation is accurate only with a steady state creatinine  Chronic Kidney disease less than 60 ml/min/1 73 sq  meters  Kidney failure less than 15 ml/min/1 73 sq  meters      Hepatic function panel [[id number]]  (Abnormal) Collected:  03/17/19 1150    Lab Status:  Final result Specimen:  Blood from Arm, Right Updated:  03/17/19 1215     Total Bilirubin 0 30 mg/dL      Bilirubin, Direct 0 07 mg/dL      Alkaline Phosphatase 73 U/L      AST 13 U/L      ALT 23 U/L      Total Protein 7 7 g/dL      Albumin 3 3 g/dL     Lipase [[id number]]  (Normal) Collected:  03/17/19 1150    Lab Status:  Final result Specimen:  Blood from Arm, Right Updated:  03/17/19 1215     Lipase 94 u/L     Urine Microscopic [[id number]]  (Abnormal) Collected:  03/17/19 1150    Lab Status:  Final result Specimen:  Urine, Clean Catch Updated:  03/17/19 1213     RBC, UA 2-4 /hpf      WBC, UA 2-4 /hpf      Epithelial Cells Moderate /hpf      Bacteria, UA Moderate /hpf     CBC and differential [[id number]] Collected:  03/17/19 1150    Lab Status:  Final result Specimen:  Blood from Arm, Right Updated:  03/17/19 1156     WBC 7 10 Thousand/uL      RBC 4 37 Million/uL      Hemoglobin 13 0 g/dL      Hematocrit 40 1 %      MCV 92 fL      MCH 29 7 pg      MCHC 32 4 g/dL      RDW 13 1 %      MPV 9 9 fL      Platelets 395 Thousands/uL      nRBC 0 /100 WBCs      Neutrophils Relative 54 %      Immat GRANS % 0 %      Lymphocytes Relative 37 %      Monocytes Relative 7 %      Eosinophils Relative 2 %      Basophils Relative 0 %      Neutrophils Absolute 3 83 Thousands/µL      Immature Grans Absolute 0 03 Thousand/uL      Lymphocytes Absolute 2 60 Thousands/µL      Monocytes Absolute 0 46 Thousand/µL      Eosinophils Absolute 0 15 Thousand/µL      Basophils Absolute 0 03 Thousands/µL     UA w Reflex to Microscopic [[id number]]  (Abnormal) Collected:  03/17/19 1150    Lab Status:  Final result Specimen:  Urine, Clean Catch Updated:  03/17/19 1156     Color, UA Yellow     Clarity, UA Clear     Specific Gravity, UA >=1 030     pH, UA 5 5     Leukocytes, UA Negative     Nitrite, UA Positive     Protein, UA Negative mg/dl      Glucose, UA Negative mg/dl      Ketones, UA Negative mg/dl      Urobilinogen, UA 0 2 E U /dl      Bilirubin, UA Negative     Blood, UA Negative                 PE Study with CT Abdomen and Pelvis with contrast   Final Result by Beryle Panda, MD (03/17 1409)      1  No evidence of pulmonary embolism or other acute intrathoracic abnormality  2   2 mm calculus at the right UVJ with associated mild hydroureter  No hydronephrosis  The study was marked in [facility] for immediate notification  Workstation performed: [id number]                    Procedures  Procedures       Phone Contacts  ED Phone Contact    ED Course  ED Course as of Mar 17 1437   Gia Waddell Mar 17, 2019   1207 Will consider treating for pyelo given flank pain/tenderness  Nitrite, UA(!): Positive   1246 Bacteria, UA(!): Moderate   1246 WBC, UA(!): 2-4   1433 Updated patient about significant CT findings of a 2 mm right UVJ stone which is the likely culprit of her symptoms  Will prescribe analgesics to go home with as well as Flomax  Will also start her on Keflex given nitrite positive urinalysis which could be a false positive from blood but will still cover with a course of Keflex  Advised her to return immediately if her pain worsens or is uncontrolled at home, she develops new or intractable vomiting, new fevers, urinary symptoms suggesting UTI or urinary retention  Will give referral for Urology  MDM  Number of Diagnoses or Management Options  Diagnosis management comments: 70-year-old female presents with right flank pain for the past 2 weeks, worsened by certain movements, bending down and with deep inhalation  Differential includes musculoskeletal strain or spasm, biliary colic, cholecystitis, renal colic secondary to stone, UTI or pyelonephritis, possible appendicitis, diverticulitis, pulmonary embolism, right lower lobe pneumonia  Will check labs, UA and obtain a PE study and CT abdomen and pelvis  Will give IV fluids, Lidoderm patch and Toradol for symptomatic relief         Amount and/or Complexity of Data Reviewed  Clinical lab tests: ordered and reviewed  Tests in the radiology section of CPT®: ordered and reviewed  Independent visualization of images, tracings, or specimens: yes        Disposition  Final diagnoses:   Right flank pain   Right ureteral stone     Time reflects when diagnosis was documented in both MDM as applicable and the Disposition within this note     Time User Action Codes Description Comment    3/17/2019  2:34 PM Kristen TORRES Add [R10 9] Right flank pain     3/17/2019  2:34 PM Kristen TORRES Add [N20 1] Right ureteral stone       ED Disposition     ED Disposition Condition Date/Time Comment    Discharge Stable Sun Mar 17, 2019  2:34 PM Hailey Cesar discharge to home/self care              Follow-up Information     Follow up With Specialties Details Why Contact Info Additional Information    Jorgito Brooks, [address] [facility], Nurse Practitioner Schedule an appointment as soon as possible for a visit   [phone]       575 [facility]rology [facility]rology Schedule an appointment as soon as possible for a visit   [address]  1121 [facility] [address]  701  [facility], [address] [facility],  [address], [place]  22  Emergency Department Emergency Medicine Go to  If symptoms worsen, view of worsening or uncontrolled pain, new or intractable nausea or vomiting, fever, shaking chills, dysuria or change in urinary frequency [address] ED, [address] [place], [place], [address]          Patient's Medications   Discharge Prescriptions    CEPHALEXIN (KEFLEX) 500 MG CAPSULE    Take 1 capsule (500 mg total) by mouth every 8 (eight) hours for 7 days       Start Date: 3/17/2019 End Date: 3/24/2019       Order Dose: 500 mg       Quantity: 21 capsule    Refills: 0    NAPROXEN (NAPROSYN) 500 MG TABLET    Take 1 tablet (500 mg total) by mouth every 12 (twelve) hours as needed for mild pain or moderate pain       Start Date: 3/17/2019 End Date: --       Order Dose: 500 mg       Quantity: 20 tablet    Refills: 0    OXYCODONE-ACETAMINOPHEN (PERCOCET) 5-325 MG PER TABLET    Take 1 tablet by mouth every 4 (four) hours as needed for severe pain for up to 10 daysMax Daily Amount: 6 tablets       Start Date: 3/17/2019 End Date: 3/27/2019       Order Dose: 1 tablet       Quantity: 12 tablet    Refills: 0    TAMSULOSIN (FLOMAX) 0 4 MG    Take 1 capsule (0 4 mg total) by mouth daily with dinner       Start Date: 3/17/2019 End Date: --       Order Dose: 0 4 mg       Quantity: 7 capsule    Refills: 0     No discharge procedures on file      ED Provider  Electronically Signed by           Karina Rebolledo DO  03/17/19 2815

## 2019-03-20 LAB — BACTERIA UR CULT: ABNORMAL

## 2019-03-21 ENCOUNTER — TELEPHONE (OUTPATIENT)
Dept: EMERGENCY DEPT | Facility: HOSPITAL | Age: 59
End: 2019-03-21

## 2019-03-21 DIAGNOSIS — N39.0 URINARY TRACT INFECTION WITHOUT HEMATURIA, SITE UNSPECIFIED: Primary | ICD-10-CM

## 2019-03-21 RX ORDER — CIPROFLOXACIN 500 MG/1
500 TABLET, FILM COATED ORAL 2 TIMES DAILY
Qty: 10 TABLET | Refills: 0 | Status: SHIPPED | OUTPATIENT
Start: 2019-03-21 | End: 2019-03-26

## 2019-06-02 DIAGNOSIS — I10 ESSENTIAL HYPERTENSION: ICD-10-CM

## 2019-06-03 DIAGNOSIS — I10 ESSENTIAL HYPERTENSION: ICD-10-CM

## 2019-06-05 RX ORDER — AMLODIPINE BESYLATE AND BENAZEPRIL HYDROCHLORIDE 5; 40 MG/1; MG/1
1 CAPSULE ORAL DAILY
Qty: 90 CAPSULE | Refills: 1 | Status: SHIPPED | OUTPATIENT
Start: 2019-06-05 | End: 2020-03-06 | Stop reason: SDUPTHER

## 2019-06-06 RX ORDER — AMLODIPINE BESYLATE AND BENAZEPRIL HYDROCHLORIDE 5; 40 MG/1; MG/1
CAPSULE ORAL
Qty: 90 CAPSULE | Refills: 1 | Status: SHIPPED | OUTPATIENT
Start: 2019-06-06 | End: 2020-05-08

## 2019-06-14 DIAGNOSIS — R60.9 EDEMA, UNSPECIFIED TYPE: ICD-10-CM

## 2019-06-14 RX ORDER — HYDROCHLOROTHIAZIDE 12.5 MG/1
TABLET ORAL
Qty: 90 TABLET | Refills: 1 | Status: SHIPPED | OUTPATIENT
Start: 2019-06-14 | End: 2019-12-11 | Stop reason: SDUPTHER

## 2019-12-11 DIAGNOSIS — R60.9 EDEMA, UNSPECIFIED TYPE: ICD-10-CM

## 2019-12-11 RX ORDER — HYDROCHLOROTHIAZIDE 12.5 MG/1
TABLET ORAL
Qty: 90 TABLET | Refills: 1 | Status: SHIPPED | OUTPATIENT
Start: 2019-12-11 | End: 2020-03-06 | Stop reason: SDUPTHER

## 2020-02-15 ENCOUNTER — HOSPITAL ENCOUNTER (EMERGENCY)
Facility: HOSPITAL | Age: 60
Discharge: HOME | End: 2020-02-15
Attending: EMERGENCY MEDICINE
Payer: COMMERCIAL

## 2020-02-15 VITALS
RESPIRATION RATE: 16 BRPM | OXYGEN SATURATION: 95 % | WEIGHT: 240 LBS | TEMPERATURE: 98.4 F | HEART RATE: 107 BPM | SYSTOLIC BLOOD PRESSURE: 148 MMHG | BODY MASS INDEX: 39.99 KG/M2 | DIASTOLIC BLOOD PRESSURE: 71 MMHG | HEIGHT: 65 IN

## 2020-02-15 DIAGNOSIS — H11.32 SUBCONJUNCTIVAL HEMORRHAGE OF LEFT EYE: ICD-10-CM

## 2020-02-15 DIAGNOSIS — R09.81 SINUS CONGESTION: Primary | ICD-10-CM

## 2020-02-15 PROCEDURE — 99283 EMERGENCY DEPT VISIT LOW MDM: CPT

## 2020-02-15 PROCEDURE — 99282 EMERGENCY DEPT VISIT SF MDM: CPT | Performed by: EMERGENCY MEDICINE

## 2020-02-15 RX ORDER — FLUTICASONE PROPIONATE 50 MCG
1 SPRAY, SUSPENSION (ML) NASAL DAILY
Qty: 16 G | Refills: 0 | Status: SHIPPED | OUTPATIENT
Start: 2020-02-15

## 2020-02-15 NOTE — ED PROVIDER NOTES
History  Chief Complaint   Patient presents with    Cold Like Symptoms     Patient c/o cold like symptoms  Patient states"she feels like she has a sinus infection"     One week of runny nose, cough, sinus congestion with mild associated cough  No fever or HA  No neck pain or stiffness  No rash but comes to ED after 3 days of unresolved constant moderate L eye redness which does not affect visual acuity  There is no eye pain or drainage  There is no retroorbital pain  There is no double vision  No h/o similar sxs  Not anticoagulated  Prior to Admission Medications   Prescriptions Last Dose Informant Patient Reported? Taking? amLODIPine-benazepril (LOTREL) 5-40 MG per capsule   No No   Sig: TAKE 1 CAPSULE BY MOUTH EVERY DAY   amLODIPine-benazepril (LOTREL) 5-40 MG per capsule   No No   Sig: Take 1 capsule by mouth daily   hydrochlorothiazide (HYDRODIURIL) 12 5 mg tablet   No No   Sig: TAKE 1 TABLET BY MOUTH EVERY DAY IN THE MORNING   naproxen (NAPROSYN) 500 mg tablet   No No   Sig: Take 1 tablet (500 mg total) by mouth every 12 (twelve) hours as needed for mild pain or moderate pain   tamsulosin (FLOMAX) 0 4 mg   No No   Sig: Take 1 capsule (0 4 mg total) by mouth daily with dinner      Facility-Administered Medications: None       Past Medical History:   Diagnosis Date    Essential hypertension     Resolved:4/21/2017       Past Surgical History:   Procedure Laterality Date    DILATION AND CURETTAGE OF UTERUS      NO PAST SURGERIES      TUBAL LIGATION         Family History   Problem Relation Age of Onset    Cancer Father         malignant neoplasm    Diabetes Cousin     Asthma Other         acute    Hypertension Other     Cancer Other         malignant neoplasm    Cancer Other         malignant neoplasm    Thyroid disease Family      I have reviewed and agree with the history as documented      Social History     Tobacco Use    Smoking status: Former Smoker    Smokeless tobacco: Never Used Substance Use Topics    Alcohol use: No    Drug use: No       Review of Systems   Constitutional: Negative for chills, fever and unexpected weight change  HENT: Positive for congestion, postnasal drip and rhinorrhea  Negative for dental problem, sinus pressure, sinus pain, sneezing, sore throat, tinnitus, trouble swallowing and voice change  Respiratory: Positive for cough  Negative for shortness of breath, wheezing and stridor  All other systems reviewed and are negative  Physical Exam  Physical Exam   Constitutional: She is oriented to person, place, and time  She appears well-developed and well-nourished  No distress  HENT:   Head: Normocephalic and atraumatic  Eyes: Pupils are equal, round, and reactive to light  EOM are normal  Right eye exhibits no discharge  Left eye exhibits no discharge  Moderate L subconj hemorrhage  Gaze is normal and conjugate, there is no pain with EOMs  There is no proptosis or chemosis  No APD  No drainage from eyes  No periorbital swelling or ecchymosis  Neck: Normal range of motion  Neck supple  No JVD present  Cardiovascular: Normal rate, regular rhythm, normal heart sounds and intact distal pulses  Exam reveals no gallop and no friction rub  No murmur heard  Pulmonary/Chest: Effort normal and breath sounds normal  No stridor  No respiratory distress  Musculoskeletal: Normal range of motion  She exhibits no edema, tenderness or deformity  Neurological: She is alert and oriented to person, place, and time  No cranial nerve deficit or sensory deficit  She exhibits normal muscle tone  Coordination normal    Skin: Skin is warm and dry  Capillary refill takes less than 2 seconds  She is not diaphoretic  Nursing note and vitals reviewed        Vital Signs  ED Triage Vitals [02/15/20 1144]   Temperature Pulse Respirations Blood Pressure SpO2   98 4 °F (36 9 °C) (!) 107 16 148/71 95 %      Temp Source Heart Rate Source Patient Position - Orthostatic VS BP Location FiO2 (%)   Oral Monitor -- -- --      Pain Score       --           Vitals:    02/15/20 1144   BP: 148/71   Pulse: (!) 107         Visual Acuity      ED Medications  Medications - No data to display    Diagnostic Studies  Results Reviewed     None                 No orders to display              Procedures  Procedures         ED Course                               MDM      Disposition  Final diagnoses:   Sinus congestion   Subconjunctival hemorrhage of left eye     Time reflects when diagnosis was documented in both MDM as applicable and the Disposition within this note     Time User Action Codes Description Comment    2/15/2020 12:12 PM [place] Fries Add [R09 81] Sinus congestion     2/15/2020 12:12 PM [place] Fries Add [H11 32] Subconjunctival hemorrhage of left eye       ED Disposition     ED Disposition Condition Date/Time Comment    Discharge Stable Sat Feb 15, 2020 12:12 PM Irma Samaniego discharge to home/self care  Follow-up Information    None         Discharge Medication List as of 2/15/2020 12:13 PM      START taking these medications    Details   fluticasone (FLONASE) 50 mcg/act nasal spray 1 spray into each nostril daily, Starting Sat 2/15/2020, Print         CONTINUE these medications which have NOT CHANGED    Details   !! amLODIPine-benazepril (LOTREL) 5-40 MG per capsule TAKE 1 CAPSULE BY MOUTH EVERY DAY, Normal      !! amLODIPine-benazepril (LOTREL) 5-40 MG per capsule Take 1 capsule by mouth daily, Starting Wed 6/5/2019, Normal      hydrochlorothiazide (HYDRODIURIL) 12 5 mg tablet TAKE 1 TABLET BY MOUTH EVERY DAY IN THE MORNING, Normal      naproxen (NAPROSYN) 500 mg tablet Take 1 tablet (500 mg total) by mouth every 12 (twelve) hours as needed for mild pain or moderate pain, Starting Sun 3/17/2019, Print      tamsulosin (FLOMAX) 0 4 mg Take 1 capsule (0 4 mg total) by mouth daily with dinner, Starting Sun 3/17/2019, Print       !! - Potential duplicate medications found  Please discuss with provider  No discharge procedures on file      PDMP Review     None          ED Provider  Electronically Signed by           Luis Campa MD  02/18/20 1563

## 2020-02-28 DIAGNOSIS — I10 ESSENTIAL HYPERTENSION: ICD-10-CM

## 2020-02-28 RX ORDER — AMLODIPINE BESYLATE AND BENAZEPRIL HYDROCHLORIDE 5; 40 MG/1; MG/1
CAPSULE ORAL
Qty: 90 CAPSULE | Refills: 1 | OUTPATIENT
Start: 2020-02-28

## 2020-02-28 NOTE — TELEPHONE ENCOUNTER
Requested medication(s) are due for refill today: Yes  Patient has already received a courtesy refill: No  Other reason request has been forwarded to provider: Requirements not met, PCP is out of the office please assist

## 2020-03-06 ENCOUNTER — OFFICE VISIT (OUTPATIENT)
Dept: FAMILY MEDICINE CLINIC | Facility: CLINIC | Age: 60
End: 2020-03-06
Payer: COMMERCIAL

## 2020-03-06 VITALS
OXYGEN SATURATION: 95 % | WEIGHT: 247.6 LBS | BODY MASS INDEX: 41.25 KG/M2 | DIASTOLIC BLOOD PRESSURE: 75 MMHG | RESPIRATION RATE: 16 BRPM | HEIGHT: 65 IN | TEMPERATURE: 98.9 F | SYSTOLIC BLOOD PRESSURE: 130 MMHG | HEART RATE: 111 BPM

## 2020-03-06 DIAGNOSIS — E66.01 MORBID OBESITY WITH BMI OF 40.0-44.9, ADULT (HCC): ICD-10-CM

## 2020-03-06 DIAGNOSIS — I10 ESSENTIAL HYPERTENSION: Primary | ICD-10-CM

## 2020-03-06 DIAGNOSIS — Z12.39 SCREENING FOR BREAST CANCER: ICD-10-CM

## 2020-03-06 DIAGNOSIS — R60.9 EDEMA, UNSPECIFIED TYPE: ICD-10-CM

## 2020-03-06 DIAGNOSIS — Z00.00 HEALTHCARE MAINTENANCE: ICD-10-CM

## 2020-03-06 DIAGNOSIS — Z12.11 SCREENING FOR COLON CANCER: ICD-10-CM

## 2020-03-06 PROCEDURE — 1036F TOBACCO NON-USER: CPT | Performed by: NURSE PRACTITIONER

## 2020-03-06 PROCEDURE — 3075F SYST BP GE 130 - 139MM HG: CPT | Performed by: NURSE PRACTITIONER

## 2020-03-06 PROCEDURE — 99214 OFFICE O/P EST MOD 30 MIN: CPT | Performed by: NURSE PRACTITIONER

## 2020-03-06 PROCEDURE — 3008F BODY MASS INDEX DOCD: CPT | Performed by: NURSE PRACTITIONER

## 2020-03-06 PROCEDURE — 3078F DIAST BP <80 MM HG: CPT | Performed by: NURSE PRACTITIONER

## 2020-03-06 RX ORDER — HYDROCHLOROTHIAZIDE 12.5 MG/1
12.5 TABLET ORAL EVERY MORNING
Qty: 30 TABLET | Refills: 0 | Status: SHIPPED | OUTPATIENT
Start: 2020-03-06 | End: 2020-06-17

## 2020-03-06 RX ORDER — AMLODIPINE BESYLATE AND BENAZEPRIL HYDROCHLORIDE 5; 40 MG/1; MG/1
1 CAPSULE ORAL DAILY
Qty: 30 CAPSULE | Refills: 0 | Status: SHIPPED | OUTPATIENT
Start: 2020-03-06 | End: 2020-03-26 | Stop reason: SDUPTHER

## 2020-03-06 NOTE — ASSESSMENT & PLAN NOTE
Education provided  Mammogram order placed  Discussed self-care with patient  Discussed the benefits of screening

## 2020-03-06 NOTE — ASSESSMENT & PLAN NOTE
Reviewed the importance of getting blood work done to monitor kidney function  Blood pressure is at goal today  Will refill medication for 1 month  Patient needs to get labs done on follow-up  Discussed the benefits of weight loss with blood pressure  And cardiovascular risk  Patient verbalized understanding  States that she is very busy and does not have the time  Discussed self-care with patient

## 2020-03-06 NOTE — ASSESSMENT & PLAN NOTE
Discussed the benefits of weight loss  Printed information given  Discussed decreasing carbohydrate intake increasing vegetable and protein  Discussed different weight loss options  Patient states that she does not want to have surgery  Will decide if she wants a referral to weight management  Does not want to take any medication

## 2020-03-06 NOTE — ASSESSMENT & PLAN NOTE
Discussed the benefits of screening  Discussed different option    Patient is amenable to cologuard  Order placed

## 2020-03-06 NOTE — PROGRESS NOTES
Assessment/Plan:  BMI Counseling: Body mass index is 41 2 kg/m²  The BMI is above normal  Nutrition recommendations include decreasing portion sizes, encouraging healthy choices of fruits and vegetables, decreasing fast food intake, consuming healthier snacks, limiting drinks that contain sugar, moderation in carbohydrate intake, increasing intake of lean protein, reducing intake of saturated and trans fat and reducing intake of cholesterol  Exercise recommendations include exercising 3-5 times per week  Essential hypertension    Reviewed the importance of getting blood work done to monitor kidney function  Blood pressure is at goal today  Will refill medication for 1 month  Patient needs to get labs done on follow-up  Discussed the benefits of weight loss with blood pressure  And cardiovascular risk  Patient verbalized understanding  States that she is very busy and does not have the time  Discussed self-care with patient  Morbid obesity with BMI of 40 0-44 9, adult ([address] )    Discussed the benefits of weight loss  Printed information given  Discussed decreasing carbohydrate intake increasing vegetable and protein  Discussed different weight loss options  Patient states that she does not want to have surgery  Will decide if she wants a referral to weight management  Does not want to take any medication  Screening for colon cancer    Discussed the benefits of screening  Discussed different option  Patient is amenable to cologuard  Order placed    Screening for breast cancer   Education provided  Mammogram order placed  Discussed self-care with patient  Discussed the benefits of screening  Problem List Items Addressed This Visit        Cardiovascular and Mediastinum    Essential hypertension - Primary       Reviewed the importance of getting blood work done to monitor kidney function  Blood pressure is at goal today  Will refill medication for 1 month    Patient needs to get labs done on follow-up  Discussed the benefits of weight loss with blood pressure  And cardiovascular risk  Patient verbalized understanding  States that she is very busy and does not have the time  Discussed self-care with patient  Relevant Medications    amLODIPine-benazepril (LOTREL) 5-40 MG per capsule    hydrochlorothiazide (HYDRODIURIL) 12 5 mg tablet    Other Relevant Orders    Comprehensive metabolic panel    Lipid panel    Microalbumin / creatinine urine ratio       Other    Morbid [facility] with BMI of 40 0-44 9, [facility]       Discussed the benefits of weight loss  Printed information given  Discussed decreasing carbohydrate intake increasing vegetable and protein  Discussed different weight loss options  Patient states that she does not want to have surgery  Will decide if she wants a referral to weight management  Does not want to take any medication  Screening for colon cancer       Discussed the benefits of screening  Discussed different option  Patient is amenable to cologuard  Order placed         Relevant Orders    Cologuard    Screening for breast cancer      Education provided  Mammogram order placed  Discussed self-care with patient  Discussed the benefits of screening  Relevant Orders    Mammo screening bilateral w cad      Other Visit Diagnoses     Edema, unspecified type        Relevant Medications    hydrochlorothiazide (HYDRODIURIL) 12 5 mg tablet    Healthcare maintenance        Relevant Orders    CBC and differential    TSH, 3rd generation with Free T4 reflex            Subjective:      Patient ID: Nita Cantrell is a 61 y o  female  Patient is here to get her meds refilled  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   Has not had labs done in a year  Patient is due for mammogram, Pap smear  Also needs to have colon cancer screening        The following portions of the patient's history were reviewed and updated as appropriate: allergies, current medications, past family history, past medical history, past social history, past surgical history and problem list     Review of Systems   Constitutional: Negative  HENT: Negative  Eyes: Negative  Respiratory: Negative  Cardiovascular: Negative  Gastrointestinal: Negative  Endocrine: Negative  Genitourinary: Negative  Musculoskeletal: Negative  Skin: Negative  Allergic/Immunologic: Negative  Neurological: Negative  Hematological: Negative  Psychiatric/Behavioral: Positive for sleep disturbance  Objective:      /75 (BP Location: Left arm, Patient Position: Sitting, Cuff Size: Adult)   Pulse (!) 111   Temp 98 9 °F (37 2 °C)   Resp 16   Ht 5' 5" (1 651 m)   Wt 112 kg (247 lb 9 6 oz)   SpO2 95%   BMI 41 20 kg/m²          Physical Exam   Constitutional: She is oriented to person, place, and time  She appears well-developed and well-nourished  HENT:   Head: Normocephalic and atraumatic  Right Ear: External ear normal    Left Ear: External ear normal    Eyes: Pupils are equal, round, and reactive to light  Neck: Normal range of motion  Cardiovascular: Normal rate and regular rhythm  Pulmonary/Chest: Effort normal    Abdominal: Soft  Bowel sounds are normal    Musculoskeletal: Normal range of motion  Neurological: She is alert and oriented to person, place, and time  Skin: Skin is warm and dry  Psychiatric: She has a normal mood and affect  Her behavior is normal  Judgment and thought content normal    Nursing note and vitals reviewed          Labs:    Lab Results   Component Value Date    WBC 7 10 03/17/2019    HGB 13 0 03/17/2019    HCT 40 1 03/17/2019    MCV 92 03/17/2019     03/17/2019     Lab Results   Component Value Date    K 3 5 03/17/2019     03/17/2019    CO2 29 03/17/2019    BUN 11 03/17/2019    CREATININE 0 87 03/17/2019    CALCIUM 9 1 03/17/2019    AST 13 03/17/2019    ALT 23 03/17/2019 ALKPHOS 73 03/17/2019    EGFR 85 03/17/2019     Lab Results   Component Value Date    CALCIUM 9 1 03/17/2019    K 3 5 03/17/2019    CO2 29 03/17/2019     03/17/2019    BUN 11 03/17/2019    CREATININE 0 87 03/17/2019       BMI Counseling: Body mass index is 41 2 kg/m²  The BMI is above normal  Nutrition recommendations include reducing portion sizes, decreasing overall calorie intake, 3-5 servings of fruits/vegetables daily, reducing fast food intake, consuming healthier snacks, decreasing soda and/or juice intake, moderation in carbohydrate intake, increasing intake of lean protein, reducing intake of saturated fat and trans fat and reducing intake of cholesterol  Exercise recommendations include exercising 3-5 times per week and strength training exercises

## 2020-03-06 NOTE — PATIENT INSTRUCTIONS
Obtain fasting labs, nothing to eat after midnight, may drink water  Obesity   AMBULATORY CARE:   Obesity  is when your body mass index (BMI) is greater than 30  Your healthcare provider will use your height and weight to measure your BMI  The risks of obesity include  many health problems, such as injuries or physical disability  You may need tests to check for the following:  · Diabetes     · High blood pressure or high cholesterol     · Heart disease     · Gallbladder or liver disease     · Cancer of the colon, breast, prostate, liver, or kidney     · Sleep apnea     · Arthritis or gout  Seek care immediately if:   · You have a severe headache, confusion, or difficulty speaking  · You have weakness on one side of your body  · You have chest pain, sweating, or shortness of breath  Contact your healthcare provider if:   · You have symptoms of gallbladder or liver disease, such as pain in your upper abdomen  · You have knee or hip pain and discomfort while walking  · You have symptoms of diabetes, such as intense hunger and thirst, and frequent urination  · You have symptoms of sleep apnea, such as snoring or daytime sleepiness  · You have questions or concerns about your condition or care  Treatment for obesity  focuses on helping you lose weight to improve your health  Even a small decrease in BMI can reduce the risk for many health problems  Your healthcare provider will help you set a weight-loss goal   · Lifestyle changes  are the first step in treating obesity  These include making healthy food choices and getting regular physical activity  Your healthcare provider may suggest a weight-loss program that involves coaching, education, and therapy  · Medicine  may help you lose weight when it is used with a healthy diet and physical activity  · Surgery  can help you lose weight if you are very obese and have other health problems   There are several types of weight-loss surgery  Ask your healthcare provider for more information  Be successful losing weight:   · Set small, realistic goals  An example of a small goal is to walk for 20 minutes 5 days a week  Anther goal is to lose 5% of your body weight  · Tell friends, family members, and coworkers about your goals  and ask for their support  Ask a friend to lose weight with you, or join a weight-loss support group  · Identify foods or triggers that may cause you to overeat , and find ways to avoid them  Remove tempting high-calorie foods from your home and workplace  Place a bowl of fresh fruit on your kitchen counter  If stress causes you to eat, then find other ways to cope with stress  · Keep a diary to track what you eat and drink  Also write down how many minutes of physical activity you do each day  Weigh yourself once a week and record it in your diary  Eating changes: You will need to eat 500 to 1,000 fewer calories each day than you currently eat to lose 1 to 2 pounds a week  The following changes will help you cut calories:  · Eat smaller portions  Use small plates, no larger than 9 inches in diameter  Fill your plate half full of fruits and vegetables  Measure your food using measuring cups until you know what a serving size looks like  · Eat 3 meals and 1 or 2 snacks each day  Plan your meals in advance  Hilda Mercado and eat at home most of the time  Eat slowly  · Eat fruits and vegetables at every meal   They are low in calories and high in fiber, which makes you feel full  Do not add butter, margarine, or cream sauce to vegetables  Use herbs to season steamed vegetables  · Eat less fat and fewer fried foods  Eat more baked or grilled chicken and fish  These protein sources are lower in calories and fat than red meat  Limit fast food  Dress your salads with olive oil and vinegar instead of bottled dressing  · Limit the amount of sugar you eat  Do not drink sugary beverages   Limit alcohol  Activity changes:  Physical activity is good for your body in many ways  It helps you burn calories and build strong muscles  It decreases stress and depression, and improves your mood  It can also help you sleep better  Talk to your healthcare provider before you begin an exercise program   · Exercise for at least 30 minutes 5 days a week  Start slowly  Set aside time each day for physical activity that you enjoy and that is convenient for you  It is best to do both weight training and an activity that increases your heart rate, such as walking, bicycling, or swimming  · Find ways to be more active  Do yard work and housecleaning  Walk up the stairs instead of using elevators  Spend your leisure time going to events that require walking, such as outdoor festivals or fairs  This extra physical activity can help you lose weight and keep it off  Follow up with your healthcare provider as directed: You may need to meet with a dietitian  Write down your questions so you remember to ask them during your visits  © 2017 [address] Information is for End User's use only and may not be sold, redistributed or otherwise used for commercial purposes  All illustrations and images included in CareNotes® are the copyrighted property of [employer]  or Ruben Lucas  The above information is an  only  It is not intended as medical advice for individual conditions or treatments  Talk to your doctor, nurse or pharmacist before following any medical regimen to see if it is safe and effective for you  Weight Management   AMBULATORY CARE:   Why it is important to manage your weight:  Being overweight increases your risk of health conditions such as heart disease, high blood pressure, type 2 diabetes, and certain types of cancer  It can also increase your risk for osteoarthritis, sleep apnea, and other respiratory problems  Aim for a slow, steady weight loss   Even a small amount of weight loss can lower your risk of health problems  How to lose weight safely:  A safe and healthy way to lose weight is to eat fewer calories and get regular exercise  You can lose up about 1 pound a week by decreasing the number of calories you eat by 500 calories each day  You can decrease calories by eating smaller portion sizes or by cutting out high-calorie foods  Read labels to find out how many calories are in the foods you eat  You can also burn calories with exercise such as walking, swimming, or biking  You will be more likely to keep weight off if you make these changes part of your lifestyle  Healthy meal plan for weight management:  A healthy meal plan includes a variety of foods, contains fewer calories, and helps you stay healthy  A healthy meal plan includes the following:  · Eat whole-grain foods more often  A healthy meal plan should contain fiber  Fiber is the part of grains, fruits, and vegetables that is not broken down by your body  Whole-grain foods are healthy and provide extra fiber in your diet  Some examples of whole-grain foods are whole-wheat breads and pastas, oatmeal, brown rice, and bulgur  · Eat a variety of vegetables every day  Include dark, leafy greens such as spinach, kale, gibran greens, and mustard greens  Eat yellow and orange vegetables such as carrots, sweet potatoes, and winter squash  · Eat a variety of fruits every day  Choose fresh or canned fruit (canned in its own juice or light syrup) instead of juice  Fruit juice has very little or no fiber  · Eat low-fat dairy foods  Drink fat-free (skim) milk or 1% milk  Eat fat-free yogurt and low-fat cottage cheese  Try low-fat cheeses such as mozzarella and other reduced-fat cheeses  · Choose meat and other protein foods that are low in fat  Choose beans or other legumes such as split peas or lentils   Choose fish, skinless poultry (chicken or turkey), or lean cuts of red meat (beef or pork)  Before you cook meat or poultry, cut off any visible fat  · Use less fat and oil  Try baking foods instead of frying them  Add less fat, such as margarine, sour cream, regular salad dressing and mayonnaise to foods  Eat fewer high-fat foods  Some examples of high-fat foods include french fries, doughnuts, ice cream, and cakes  · Eat fewer sweets  Limit foods and drinks that are high in sugar  This includes candy, cookies, regular soda, and sweetened drinks  Ways to decrease calories:   · Eat smaller portions  ¨ Use a small plate with smaller servings  ¨ Do not eat second helpings  ¨ When you eat at a restaurant, ask for a box and place half of your meal in the box before you eat  ¨ Share an entrée with someone else  · Replace high-calorie snacks with healthy, low-calorie snacks  ¨ Choose fresh fruit, vegetables, fat-free rice cakes, or air-popped popcorn instead of potato chips, nuts, or chocolate  ¨ Choose water or calorie-free drinks instead of soda or sweetened drinks  · Eat regular meals  Skipping meals can lead to overeating later in the day  Eat a healthy snack in place of a meal if you do not have time to eat a regular meal      · Do not shop for groceries when you are hungry  You may be more likely to make unhealthy food choices  Take a grocery list of healthy foods and shop after you have eaten  Exercise:  Exercise at least 30 minutes per day on most days of the week  Some examples of exercise include walking, biking, dancing, and swimming  You can also fit in more physical activity by taking the stairs instead of the elevator or parking farther away from stores  Ask your healthcare provider about the best exercise plan for you  Other things to consider as you try to lose weight:   · Be aware of situations that may give you the urge to overeat, such as eating while watching television  Find ways to avoid these situations   For example, read a book, go for a walk, or do crafts  · Meet with a weight loss support group or friends who are also trying to lose weight  This may help you stay motivated to continue working on your weight loss goals  © 2017 [address]  Information is for End User's use only and may not be sold, redistributed or otherwise used for commercial purposes  All illustrations and images included in CareNotes® are the copyrighted property of [employer]  or Ruben Lucas  The above information is an  only  It is not intended as medical advice for individual conditions or treatments  Talk to your doctor, nurse or pharmacist before following any medical regimen to see if it is safe and effective for you  Low Fat Diet   AMBULATORY CARE:   A low-fat diet  is an eating plan that is low in total fat, unhealthy fat, and cholesterol  You may need to follow a low-fat diet if you have trouble digesting or absorbing fat  You may also need to follow this diet if you have high cholesterol  You can also lower your cholesterol by increasing the amount of fiber in your diet  Soluble fiber is a type of fiber that helps to decrease cholesterol levels  Different types of fat in food:   · Limit unhealthy fats  A diet that is high in cholesterol, saturated fat, and trans fat may cause unhealthy cholesterol levels  Unhealthy cholesterol levels increase your risk of heart disease  ¨ Cholesterol:  Limit intake of cholesterol to less than 200 mg per day  Cholesterol is found in meat, eggs, and dairy  ¨ Saturated fat:  Limit saturated fat to less than 7% of your total daily calories  Ask your dietitian how many calories you need each day  Saturated fat is found in butter, cheese, ice cream, whole milk, and palm oil  Saturated fat is also found in meat, such as beef, pork, chicken skin, and processed meats  Processed meats include sausage, hot dogs, and bologna  ¨ Trans fat:  Avoid trans fat as much as possible   Trans fat is used in fried and baked foods  Foods that say trans fat free on the label may still have up to 0 5 grams of trans fat per serving  · Include healthy fats  Replace foods that are high in saturated and trans fat with foods high in healthy fats  This may help to decrease high cholesterol levels  ¨ Monounsaturated fats: These are found in avocados, nuts, and vegetable oils, such as olive, canola, and sunflower oil  ¨ Polyunsaturated fats: These can be found in vegetable oils, such as soybean or corn oil  Omega-3 fats can help to decrease the risk of heart disease  Omega-3 fats are found in fish, such as salmon, herring, trout, and tuna  Omega-3 fats can also be found in plant foods, such as walnuts, flaxseed, soybeans, and canola oil    Foods to limit or avoid:   · Grains:      ¨ Snacks that are made with partially hydrogenated oils, such as chips, regular crackers, and butter-flavored popcorn    ¨ High-fat baked goods, such as biscuits, croissants, doughnuts, pies, cookies, and pastries    · Dairy:      ¨ Whole milk, 2% milk, and yogurt and ice cream made with whole milk    ¨ Half and half creamer, heavy cream, and whipping cream    ¨ Cheese, cream cheese, and sour cream    · Meats and proteins:      ¨ High-fat cuts of meat (T-bone steak, regular hamburger, and ribs)    ¨ Fried meat, poultry (turkey and chicken), and fish    ¨ Poultry (chicken and turkey) with skin    ¨ Cold cuts (salami or bologna), hot dogs, aguilar, and sausage    ¨ Whole eggs and egg yolks    · Vegetables and fruits with added fat:      ¨ Fried vegetables or vegetables in butter or high-fat sauces, such as cream or cheese sauces    ¨ Fried fruit or fruit served with butter or cream    · Fats:      ¨ Butter, stick margarine, and shortening    ¨ Coconut, palm oil, and palm kernel oil  Foods to include:   · Grains:      ¨ Whole-grain breads, cereals, pasta, and brown rice    ¨ Low-fat crackers and pretzels    · Vegetables and fruits: ¨ Fresh, frozen, or canned vegetables (no salt or low-sodium)    ¨ Fresh, frozen, dried, or canned fruit (canned in light syrup or fruit juice)    ¨ Avocado    · Low-fat dairy products:      ¨ Nonfat (skim) or 1% milk    ¨ Nonfat or low-fat cheese, yogurt, and cottage cheese    · Meats and proteins:      ¨ Chicken or turkey with no skin    ¨ Baked or broiled fish    ¨ Lean beef and pork (loin, round, extra lean hamburger)    ¨ Beans and peas, unsalted nuts, soy products    ¨ Egg whites and substitutes    ¨ Seeds and nuts    · Fats:      ¨ Unsaturated oil, such as canola, olive, peanut, soybean, or sunflower oil    ¨ Soft or liquid margarine and vegetable oil spread    ¨ Low-fat salad dressing  Other ways to decrease fat:   · Read food labels before you buy foods  Choose foods that have less than 30% of calories from fat  Choose low-fat or fat-free dairy products  Remember that fat free does not mean calorie free  These foods still contain calories, and too many calories can lead to weight gain  · Trim fat from meat and avoid fried food  Trim all visible fat from meat before you cook it  Remove the skin from poultry  Do not franz meat, fish, or poultry  Bake, roast, boil, or broil these foods instead  Avoid fried foods  Eat a baked potato instead of [place] [employer] fries  Steam vegetables instead of sautéing them in butter  · Add less fat to foods  Use imitation aguilar bits on salads and baked potatoes instead of regular aguilar bits  Use fat-free or low-fat salad dressings instead of regular dressings  Use low-fat or nonfat butter-flavored topping instead of regular butter or margarine on popcorn and other foods  Ways to decrease fat in recipes:  Replace high-fat ingredients with low-fat or nonfat ones  This may cause baked goods to be drier than usual  You may need to use nonfat cooking spray on pans to prevent food from sticking   You also may need to change the amount of other ingredients, such as water, in the recipe  Try the following:  · Use low-fat or light margarine instead of regular margarine or shortening  · Use lean ground turkey breast or chicken, or lean ground beef (less than 5% fat) instead of hamburger  · Add 1 teaspoon of canola oil to 8 ounces of skim milk instead of using cream or half and half  · Use grated zucchini, carrots, or apples in breads instead of coconut  · Use blenderized, low-fat cottage cheese, plain tofu, or low-fat ricotta cheese instead of cream cheese  · Use 1 egg white and 1 teaspoon of canola oil, or use ¼ cup (2 ounces) of fat-free egg substitute instead of a whole egg  · Replace half of the oil that is called for in a recipe with applesauce when you bake  Use 3 tablespoons of cocoa powder and 1 tablespoon of canola oil instead of a square of baking chocolate  How to increase fiber:  Eat enough high-fiber foods to get 20 to 30 grams of fiber every day  Slowly increase your fiber intake to avoid stomach cramps, gas, and other problems  · Eat 3 ounces of whole-grain foods each day  An ounce is about 1 slice of bread  Eat whole-grain breads, such as whole-wheat bread  Whole wheat, whole-wheat flour, or other whole grains should be listed as the first ingredient on the food label  Replace white flour with whole-grain flour or use half of each in recipes  Whole-grain flour is heavier than white flour, so you may have to add more yeast or baking powder  · Eat a high-fiber cereal for breakfast   Oatmeal is a good source of soluble fiber  Look for cereals that have bran or fiber in the name  Choose whole-grain products, such as brown rice, barley, and whole-wheat pasta  · Eat more beans, peas, and lentils  For example, add beans to soups or salads  Eat at least 5 cups of fruits and vegetables each day  Eat fruits and vegetables with the peel because the peel is high in fiber    © 2017 [address] Information is for End User's use only and may not be sold, redistributed or otherwise used for commercial purposes  All illustrations and images included in CareNotes® are the copyrighted property of [employer]  or Ruben Lucas  The above information is an  only  It is not intended as medical advice for individual conditions or treatments  Talk to your doctor, nurse or pharmacist before following any medical regimen to see if it is safe and effective for you  Heart Healthy Diet   AMBULATORY CARE:   A heart healthy diet  is an eating plan low in total fat, unhealthy fats, and sodium (salt)  A heart healthy diet helps decrease your risk for heart disease and stroke  Limit the amount of fat you eat to 25% to 35% of your total daily calories  Limit sodium to less than 2,300 mg each day  Healthy fats:  Healthy fats can help improve cholesterol levels  The risk for heart disease is decreased when cholesterol levels are normal  Choose healthy fats, such as the following:  · Unsaturated fat  is found in foods such as soybean, canola, olive, corn, and safflower oils  It is also found in soft tub margarine that is made with liquid vegetable oil  · Omega-3 fat  is found in certain fish, such as salmon, tuna, and trout, and in walnuts and flaxseed  Unhealthy fats:  Unhealthy fats can cause unhealthy cholesterol levels in your blood and increase your risk of heart disease  Limit unhealthy fats, such as the following:  · Cholesterol  is found in animal foods, such as eggs and lobster, and in dairy products made from whole milk  Limit cholesterol to less than 300 milligrams (mg) each day  You may need to limit cholesterol to 200 mg each day if you have heart disease  · Saturated fat  is found in meats, such as aguilar and hamburger  It is also found in chicken or turkey skin, whole milk, and butter  Limit saturated fat to less than 7% of your total daily calories   Limit saturated fat to less than 6% if you have heart disease or are at increased risk for it  · Trans fat  is found in packaged foods, such as potato chips and cookies  It is also in hard margarine, some fried foods, and shortening  Avoid trans fats as much as possible    Heart healthy foods and drinks to include:  Ask your dietitian or healthcare provider how many servings to have from each of the following food groups:  · Grains:      ¨ Whole-wheat breads, cereals, and pastas, and brown rice    ¨ Low-fat, low-sodium crackers and chips    · Vegetables:      ¨ Broccoli, green beans, green peas, and spinach    ¨ Collards, kale, and lima beans    ¨ Carrots, sweet potatoes, tomatoes, and peppers    ¨ Canned vegetables with no salt added    · Fruits:      ¨ Bananas, peaches, pears, and pineapple    ¨ Grapes, raisins, and dates    ¨ Oranges, tangerines, grapefruit, orange juice, and grapefruit juice    ¨ Apricots, mangoes, melons, and papaya    ¨ Raspberries and strawberries    ¨ Canned fruit with no added sugar    · Low-fat dairy products:      ¨ Nonfat (skim) milk, 1% milk, and low-fat almond, cashew, or soy milks fortified with calcium    ¨ Low-fat cheese, regular or frozen yogurt, and cottage cheese    · Meats and proteins , such as lean cuts of beef and pork (loin, leg, round), skinless chicken and turkey, legumes, soy products, egg whites, and nuts  Foods and drinks to limit or avoid:  Ask your dietitian or healthcare provider about these and other foods that are high in unhealthy fat, sodium, and sugar:  · Snack or packaged foods , such as frozen dinners, cookies, macaroni and cheese, and cereals with more than 300 mg of sodium per serving    · Canned or dry mixes  for cakes, soups, sauces, or gravies    · Vegetables with added sodium , such as instant potatoes, vegetables with added sauces, or regular canned vegetables    · Other foods high in sodium , such as ketchup, barbecue sauce, salad dressing, pickles, olives, soy sauce, and miso    · High-fat dairy foods  such as whole or 2% milk, cream cheese, or sour cream, and cheeses     · High-fat protein foods  such as high-fat cuts of beef (T-bone steaks, ribs), chicken or turkey with skin, and organ meats, such as liver    · Cured or smoked meats , such as hot dogs, aguilar, and sausage    · Unhealthy fats and oils , such as butter, stick margarine, shortening, and cooking oils such as coconut or palm oil    · Food and drinks high in sugar , such as soft drinks (soda), sports drinks, sweetened tea, candy, cake, cookies, pies, and doughnuts  Other diet guidelines to follow:   · Eat more foods containing omega-3 fats  Eat fish high in omega-3 fats at least 2 times a week  · Limit alcohol  Too much alcohol can damage your heart and raise your blood pressure  Women should limit alcohol to 1 drink a day  Men should limit alcohol to 2 drinks a day  A drink of alcohol is 12 ounces of beer, 5 ounces of wine, or 1½ ounces of liquor  · Choose low-sodium foods  High-sodium foods can lead to high blood pressure  Add little or no salt to food you prepare  Use herbs and spices in place of salt  · Eat more fiber  to help lower cholesterol levels  Eat at least 5 servings of fruits and vegetables each day  Eat 3 ounces of whole-grain foods each day  Legumes (beans) are also a good source of fiber  Lifestyle guidelines:   · Do not smoke  Nicotine and other chemicals in cigarettes and cigars can cause lung and heart damage  Ask your healthcare provider for information if you currently smoke and need help to quit  E-cigarettes or smokeless tobacco still contain nicotine  Talk to your healthcare provider before you use these products  · Exercise regularly  to help you maintain a healthy weight and improve your blood pressure and cholesterol levels  Ask your healthcare provider about the best exercise plan for you  Do not start an exercise program without asking your healthcare provider     Follow up with your healthcare provider as directed: Write down your questions so you remember to ask them during your visits  © 2017 [address] Information is for End User's use only and may not be sold, redistributed or otherwise used for commercial purposes  All illustrations and images included in CareNotes® are the copyrighted property of [employer]  or Ruben Lucas  The above information is an  only  It is not intended as medical advice for individual conditions or treatments  Talk to your doctor, nurse or pharmacist before following any medical regimen to see if it is safe and effective for you  Calorie Counting Diet   WHAT YOU NEED TO KNOW:   What is a calorie counting diet? It is a meal plan based on counting calories each day to reach a healthy body weight  You will need to eat fewer calories if you are trying to lose weight  Weight loss may decrease your risk for certain health problems or improve your health if you have health problems  Some of these health problems include heart disease, high blood pressure, and diabetes  What foods should I avoid? Your dietitian will tell you if you need to avoid certain foods based on your body weight and health condition  You may need to avoid high-fat foods if you are at risk for or have heart disease  You may need to eat fewer foods from the breads and starches food group if you have diabetes  How many calories are in foods? The following is a list of foods and drinks with the approximate number of calories in each  Check the food label to find the exact number of calories  A dietitian can tell you how many calories you should have from each food group each day    · Carbohydrate:      ¨ ½ of a 3-inch bagel, 1 slice of bread, or ½ of a hamburger bun or hot dog bun (80)    ¨ 1 (8-inch) flour tortilla or ½ cup of cooked rice (100)    ¨ 1 (6-inch) corn tortilla (80)    ¨ 1 (6-inch) pancake or 1 cup of bran flakes cereal (110)    ¨ ½ cup of cooked cereal (80)    ¨ ½ cup of cooked pasta (85)    ¨ 1 ounce of pretzels (100)    ¨ 3 cups of air-popped popcorn without butter or oil (80)    · Dairy:      ¨ 1 cup of skim or 1% milk (90)    ¨ 1 cup of 2% milk (120)    ¨ 1 cup of whole milk (160)    ¨ 1 cup of 2% chocolate milk (220)    ¨ 1 ounce of low-fat cheese with 3 grams of fat per ounce (70)    ¨ 1 ounce of cheddar cheese (114)    ¨ ½ cup of 1% fat cottage cheese (80)    ¨ 1 cup of plain or sugar-free, fat-free yogurt (90)    · Protein foods:      ¨ 3 ounces of fish (not breaded or fried) (95)    ¨ 3 ounces of breaded, fried fish (195)    ¨ ¾ cup of tuna canned in water (105)    ¨ 3 ounces of chicken breast without skin (105)    ¨ 1 fried chicken breast with skin (350)    ¨ ¼ cup of fat free egg substitute (40)    ¨ 1 large egg (75)    ¨ 3 ounces of lean beef or pork (165)    ¨ 3 ounces of fried pork chop or ham (185)    ¨ ½ cup of cooked dried beans, such as kidney, cheney, lentils, or navy (115)    ¨ 3 ounces of bologna or lunch meat (225)    ¨ 2 links of breakfast sausage (140)    · Vegetables:      ¨ ½ cup of sliced mushrooms (10)    ¨ 1 cup of salad greens, such as lettuce, spinach, or junior (15)    ¨ ½ cup of steamed asparagus (20)    ¨ ½ cup of cooked summer squash, zucchini squash, or green or wax beans (25)    ¨ 1 cup of broccoli or cauliflower florets, or 1 medium tomato (25)    ¨ 1 large raw carrot or ½ cup of cooked carrots (40)    ¨ ? of a medium cucumber or 1 stalk of celery (5)    ¨ 1 small baked potato (160)    ¨ 1 cup of breaded, fried vegetables (230)    · Fruit:      ¨ 1 (6-inch) banana (55)     ¨ ½ of a 4-inch grapefruit (55)    ¨ 15 grapes (60)    ¨ 1 medium orange or apple (70)    ¨ 1 large peach (65)    ¨ 1 cup of fresh pineapple chunks (75)    ¨ 1 cup of melon cubes (50)    ¨ 1¼ cups of whole strawberries (45)    ¨ ½ cup of fruit canned in juice (55)    ¨ ½ cup of fruit canned in heavy syrup (110)    ¨ ?  cup of raisins (130)    ¨ ½ cup of unsweetened fruit juice (60)    ¨ ½ cup of grape, cranberry, or prune juice (90)    · Fat:      ¨ 10 peanuts or 2 teaspoons of peanut butter (55)    ¨ 2 tablespoons of avocado or 1 tablespoon of regular salad dressing (45)    ¨ 2 slices of aguilar (90)    ¨ 1 teaspoon of oil, such as safflower, canola, corn, or olive oil (45)    ¨ 2 teaspoons of low-fat margarine, or 1 tablespoon of low-fat mayonnaise (50)    ¨ 1 teaspoon of regular margarine (40)    ¨ 1 tablespoon of regular mayonnaise (135)    ¨ 1 tablespoon of cream cheese or 2 tablespoons of low-fat cream cheese (45)    ¨ 2 tablespoons of vegetable shortening (215)    · Dessert and sweets:      ¨ 8 animal crackers or 5 vanilla wafers (80)    ¨ 1 frozen fruit juice bar (80)    ¨ ½ cup of ice milk or low-fat frozen yogurt (90)    ¨ ½ cup of sherbet or sorbet (125)    ¨ ½ cup of sugar-free pudding or custard (60)    ¨ ½ cup of ice cream (140)    ¨ ½ cup of pudding or custard (175)    ¨ 1 (2-inch) square chocolate brownie (185)    · Combination foods:      ¨ Bean burrito made with an 8-inch tortilla, without cheese (275)    ¨ Chicken breast sandwich with lettuce and tomato (325)    ¨ 1 cup of chicken noodle soup (60)    ¨ 1 beef taco (175)    ¨ Regular hamburger with lettuce and tomato (310)    ¨ Regular cheeseburger with lettuce and tomato (410)     ¨ ¼ of a 12-inch cheese pizza (280)    ¨ Fried fish sandwich with lettuce and tomato (425)    ¨ Hot dog and bun (275)    ¨ 1½ cups of macaroni and cheese (310)    ¨ Taco salad with a fried tortilla shell (870)    · Low-calorie foods:      ¨ 1 tablespoon of ketchup or 1 tablespoon of fat free sour cream (15)    ¨ 1 teaspoon of mustard (5)    ¨ ¼ cup of salsa (20)    ¨ 1 large dill pickle (15)    ¨ 1 tablespoon of fat free salad dressing (10)    ¨ 2 teaspoons of low-sugar, light jam or jelly, or 1 tablespoon of sugar-free syrup (15)    ¨ 1 sugar-free popsicle (15)    ¨ 1 cup of club soda, seltzer water, or diet soda (0)  CARE AGREEMENT:   You have the right to help plan your care  Discuss treatment options with your caregivers to decide what care you want to receive  You always have the right to refuse treatment  The above information is an  only  It is not intended as medical advice for individual conditions or treatments  Talk to your doctor, nurse or pharmacist before following any medical regimen to see if it is safe and effective for you  © 2017 [address] Information is for End User's use only and may not be sold, redistributed or otherwise used for commercial purposes  All illustrations and images included in CareNotes® are the copyrighted property of [email or web address]  or Ruben Lucas

## 2020-03-26 ENCOUNTER — TELEMEDICINE (OUTPATIENT)
Dept: FAMILY MEDICINE CLINIC | Facility: CLINIC | Age: 60
End: 2020-03-26
Payer: COMMERCIAL

## 2020-03-26 DIAGNOSIS — R53.82 CHRONIC FATIGUE: ICD-10-CM

## 2020-03-26 DIAGNOSIS — E78.2 MIXED HYPERLIPIDEMIA: Primary | ICD-10-CM

## 2020-03-26 DIAGNOSIS — Z12.11 SCREENING FOR COLON CANCER: ICD-10-CM

## 2020-03-26 DIAGNOSIS — Z11.59 NEED FOR HEPATITIS C SCREENING TEST: ICD-10-CM

## 2020-03-26 DIAGNOSIS — I10 ESSENTIAL HYPERTENSION: ICD-10-CM

## 2020-03-26 PROCEDURE — G2012 BRIEF CHECK IN BY MD/QHP: HCPCS | Performed by: FAMILY MEDICINE

## 2020-03-26 RX ORDER — AMLODIPINE BESYLATE AND BENAZEPRIL HYDROCHLORIDE 5; 40 MG/1; MG/1
1 CAPSULE ORAL DAILY
Qty: 90 CAPSULE | Refills: 1 | Status: SHIPPED | OUTPATIENT
Start: 2020-03-26 | End: 2020-06-17 | Stop reason: SDUPTHER

## 2020-03-26 NOTE — PROGRESS NOTES
Virtual Regular Visit    Problem List Items Addressed This Visit     None               Reason for visit is medication refill  Request refill of BP meds    Encounter provider CLEMENTINE Suggs    Provider located at [facility] [phone] [address] [phone]      Recent Visits  No visits were found meeting these conditions  Showing recent visits within past 7 days and meeting all other requirements     Today's Visits  Date Type Provider Dept   03/26/20 Telemedicine Marty Suggs-[address] today's visits and meeting all other requirements     Future Appointments  Date Type Provider Dept   03/26/20 Telemedicine CLEMENTINE Suggs  [facility] [address] [facility]   Showing future appointments within next 150 days and meeting all other requirements        After connecting through [employer], the patient was identified by name and date of birth  Irma Samaniego was informed that this is a telemedicine visit and that the visit is being conducted through telephone which may not be secure and therefore, might not be HIPAA-compliant  My office door was closed  No one else was in the room  She acknowledged consent and understanding of privacy and security of the video platform  The patient has agreed to participate and understands they can discontinue the visit at any time  Subjective  Irma Samaniego is a 61 y o  female with essential HTN requesting refill on BP meds      She takes Lotrel 5/40 mg daily with 12 5mg HCTZ  BP at goal      Past Medical History:   Diagnosis Date    Essential hypertension     Resolved:4/21/2017       Past Surgical History:   Procedure Laterality Date    DILATION AND CURETTAGE OF UTERUS      NO PAST SURGERIES      TUBAL LIGATION         Current Outpatient Medications   Medication Sig Dispense Refill    amLODIPine-benazepril (LOTREL) 5-40 MG per capsule TAKE 1 CAPSULE BY MOUTH EVERY DAY 90 capsule 1    amLODIPine-benazepril (LOTREL) 5-40 MG per capsule Take 1 capsule by mouth daily 30 capsule 0    fluticasone (FLONASE) 50 mcg/act nasal spray 1 spray into each nostril daily 16 g 0    hydrochlorothiazide (HYDRODIURIL) 12 5 mg tablet Take 1 tablet (12 5 mg total) by mouth every morning 30 tablet 0    naproxen (NAPROSYN) 500 mg tablet Take 1 tablet (500 mg total) by mouth every 12 (twelve) hours as needed for mild pain or moderate pain 20 tablet 0    tamsulosin (FLOMAX) 0 4 mg Take 1 capsule (0 4 mg total) by mouth daily with dinner 7 capsule 0     No current facility-administered medications for this visit  No Known Allergies    Review of Systems   Constitutional: Negative for activity change and fever  HENT: Negative for nosebleeds and trouble swallowing  Eyes: Negative  Respiratory: Negative  Cardiovascular: Negative for palpitations  Gastrointestinal: Negative for abdominal pain, blood in stool and vomiting  Endocrine: Negative for cold intolerance  Genitourinary: Negative  Musculoskeletal: Negative for neck stiffness  Skin: Negative for pallor  Allergic/Immunologic: Negative for immunocompromised state  Neurological: Negative for seizures and facial asymmetry  Hematological: Negative for adenopathy  Psychiatric/Behavioral: Negative for agitation and suicidal ideas       Social History     Socioeconomic History    Marital status: /Civil Union     Spouse name: Not on file    Number of children: Not on file    Years of education: Not on file    Highest education level: Not on file   Occupational History    Occupation: Self Employed   Social Needs    Financial resource strain: Not on file    Food insecurity:     Worry: Not on file     Inability: Not on file    Transportation needs:     Medical: Not on file     Non-medical: Not on file   Tobacco Use    Smoking status: Former Smoker    Smokeless tobacco: Never Used   Substance and Sexual Activity    Alcohol use: No    Drug use: No    Sexual activity: Not on file   Lifestyle    Physical activity:     Days per week: Not on file     Minutes per session: Not on file    Stress: Not on file   Relationships    Social connections:     Talks on phone: Not on file     Gets together: Not on file     Attends Bahai service: Not on file     Active member of club or organization: Not on file     Attends meetings of clubs or organizations: Not on file     Relationship status: Not on file    Intimate partner violence:     Fear of current or ex partner: Not on file     Emotionally abused: Not on file     Physically abused: Not on file     Forced sexual activity: Not on file   Other Topics Concern    Not on file   Social History Narrative    Employed    Lives alone with help available    Never uses seat belt    Self Employed     Past Medical History:   Diagnosis Date    Essential hypertension     Resolved:4/21/2017       Current Outpatient Medications:     amLODIPine-benazepril (LOTREL) 5-40 MG per capsule, TAKE 1 CAPSULE BY MOUTH EVERY DAY, Disp: 90 capsule, Rfl: 1    amLODIPine-benazepril (LOTREL) 5-40 MG per capsule, Take 1 capsule by mouth daily, Disp: 90 capsule, Rfl: 1    fluticasone (FLONASE) 50 mcg/act nasal spray, 1 spray into each nostril daily, Disp: 16 g, Rfl: 0    hydrochlorothiazide (HYDRODIURIL) 12 5 mg tablet, Take 1 tablet (12 5 mg total) by mouth every morning, Disp: 30 tablet, Rfl: 0    naproxen (NAPROSYN) 500 mg tablet, Take 1 tablet (500 mg total) by mouth every 12 (twelve) hours as needed for mild pain or moderate pain, Disp: 20 tablet, Rfl: 0    tamsulosin (FLOMAX) 0 4 mg, Take 1 capsule (0 4 mg total) by mouth daily with dinner, Disp: 7 capsule, Rfl: 0  Family History   Problem Relation Age of Onset    Cancer Father         malignant neoplasm    Diabetes Cousin     Asthma Other         acute    Hypertension Other     Cancer Other malignant neoplasm    Cancer Other         malignant neoplasm    Thyroid disease Family        I spent 20 minutes with the patient during this visit

## 2020-04-22 ENCOUNTER — TELEPHONE (OUTPATIENT)
Dept: OTHER | Facility: OTHER | Age: 60
End: 2020-04-22

## 2020-04-23 ENCOUNTER — TELEMEDICINE (OUTPATIENT)
Dept: FAMILY MEDICINE CLINIC | Facility: CLINIC | Age: 60
End: 2020-04-23
Payer: COMMERCIAL

## 2020-04-23 VITALS
SYSTOLIC BLOOD PRESSURE: 112 MMHG | WEIGHT: 247 LBS | RESPIRATION RATE: 16 BRPM | BODY MASS INDEX: 41.15 KG/M2 | TEMPERATURE: 98 F | HEART RATE: 88 BPM | HEIGHT: 65 IN | DIASTOLIC BLOOD PRESSURE: 68 MMHG

## 2020-04-23 DIAGNOSIS — J01.00 ACUTE NON-RECURRENT MAXILLARY SINUSITIS: ICD-10-CM

## 2020-04-23 DIAGNOSIS — J01.10 ACUTE NON-RECURRENT FRONTAL SINUSITIS: Primary | ICD-10-CM

## 2020-04-23 PROCEDURE — 99213 OFFICE O/P EST LOW 20 MIN: CPT | Performed by: FAMILY MEDICINE

## 2020-04-23 RX ORDER — AMOXICILLIN AND CLAVULANATE POTASSIUM 875; 125 MG/1; MG/1
1 TABLET, FILM COATED ORAL EVERY 12 HOURS SCHEDULED
Qty: 20 TABLET | Refills: 0 | Status: SHIPPED | OUTPATIENT
Start: 2020-04-23 | End: 2020-05-03

## 2020-04-23 RX ORDER — GUAIFENESIN 100 MG/5ML
200 SYRUP ORAL 3 TIMES DAILY PRN
Qty: 120 ML | Refills: 0 | Status: SHIPPED | OUTPATIENT
Start: 2020-04-23 | End: 2020-05-03

## 2020-05-08 ENCOUNTER — TELEMEDICINE (OUTPATIENT)
Dept: FAMILY MEDICINE CLINIC | Facility: CLINIC | Age: 60
End: 2020-05-08
Payer: COMMERCIAL

## 2020-05-08 VITALS — WEIGHT: 240 LBS | HEIGHT: 65 IN | BODY MASS INDEX: 39.99 KG/M2

## 2020-05-08 DIAGNOSIS — L29.9 PRURITIC DERMATITIS: ICD-10-CM

## 2020-05-08 DIAGNOSIS — L23.9 ALLERGIC CONTACT DERMATITIS, UNSPECIFIED TRIGGER: Primary | ICD-10-CM

## 2020-05-08 PROCEDURE — 99214 OFFICE O/P EST MOD 30 MIN: CPT | Performed by: NURSE PRACTITIONER

## 2020-05-08 RX ORDER — HYDROXYZINE HYDROCHLORIDE 10 MG/1
10 TABLET, FILM COATED ORAL EVERY 6 HOURS PRN
Qty: 30 TABLET | Refills: 0 | Status: SHIPPED | OUTPATIENT
Start: 2020-05-08 | End: 2021-09-27 | Stop reason: ALTCHOICE

## 2020-06-12 ENCOUNTER — TELEMEDICINE (OUTPATIENT)
Dept: FAMILY MEDICINE CLINIC | Facility: CLINIC | Age: 60
End: 2020-06-12
Payer: COMMERCIAL

## 2020-06-12 VITALS — BODY MASS INDEX: 40.65 KG/M2 | WEIGHT: 244 LBS | HEIGHT: 65 IN

## 2020-06-12 DIAGNOSIS — J01.11 ACUTE RECURRENT FRONTAL SINUSITIS: Primary | ICD-10-CM

## 2020-06-12 PROCEDURE — 3008F BODY MASS INDEX DOCD: CPT | Performed by: NURSE PRACTITIONER

## 2020-06-12 PROCEDURE — 3074F SYST BP LT 130 MM HG: CPT | Performed by: NURSE PRACTITIONER

## 2020-06-12 PROCEDURE — 1036F TOBACCO NON-USER: CPT | Performed by: NURSE PRACTITIONER

## 2020-06-12 PROCEDURE — 3078F DIAST BP <80 MM HG: CPT | Performed by: NURSE PRACTITIONER

## 2020-06-12 PROCEDURE — 99213 OFFICE O/P EST LOW 20 MIN: CPT | Performed by: NURSE PRACTITIONER

## 2020-06-12 RX ORDER — DOXYCYCLINE HYCLATE 100 MG/1
100 CAPSULE ORAL EVERY 12 HOURS SCHEDULED
Qty: 14 CAPSULE | Refills: 0 | Status: SHIPPED | OUTPATIENT
Start: 2020-06-12 | End: 2020-06-19

## 2020-06-15 DIAGNOSIS — R60.9 EDEMA, UNSPECIFIED TYPE: ICD-10-CM

## 2020-06-17 DIAGNOSIS — I10 ESSENTIAL HYPERTENSION: ICD-10-CM

## 2020-06-17 RX ORDER — AMLODIPINE BESYLATE AND BENAZEPRIL HYDROCHLORIDE 5; 40 MG/1; MG/1
1 CAPSULE ORAL DAILY
Qty: 90 CAPSULE | Refills: 1 | Status: SHIPPED | OUTPATIENT
Start: 2020-06-17 | End: 2020-12-22

## 2020-06-17 RX ORDER — HYDROCHLOROTHIAZIDE 12.5 MG/1
TABLET ORAL
Qty: 90 TABLET | Refills: 1 | Status: SHIPPED | OUTPATIENT
Start: 2020-06-17 | End: 2020-12-22

## 2020-06-19 ENCOUNTER — TELEMEDICINE (OUTPATIENT)
Dept: FAMILY MEDICINE CLINIC | Facility: CLINIC | Age: 60
End: 2020-06-19
Payer: COMMERCIAL

## 2020-06-19 VITALS — HEIGHT: 65 IN | WEIGHT: 242 LBS | BODY MASS INDEX: 40.32 KG/M2

## 2020-06-19 DIAGNOSIS — J01.11 ACUTE RECURRENT FRONTAL SINUSITIS: Primary | ICD-10-CM

## 2020-06-19 PROCEDURE — 99213 OFFICE O/P EST LOW 20 MIN: CPT | Performed by: NURSE PRACTITIONER

## 2020-07-25 ENCOUNTER — OFFICE VISIT (OUTPATIENT)
Dept: FAMILY MEDICINE CLINIC | Facility: CLINIC | Age: 60
End: 2020-07-25
Payer: COMMERCIAL

## 2020-07-25 VITALS
TEMPERATURE: 97.4 F | DIASTOLIC BLOOD PRESSURE: 74 MMHG | WEIGHT: 244 LBS | BODY MASS INDEX: 40.65 KG/M2 | RESPIRATION RATE: 18 BRPM | OXYGEN SATURATION: 99 % | HEIGHT: 65 IN | SYSTOLIC BLOOD PRESSURE: 124 MMHG | HEART RATE: 94 BPM

## 2020-07-25 DIAGNOSIS — M81.0 AGE-RELATED OSTEOPOROSIS WITHOUT CURRENT PATHOLOGICAL FRACTURE: ICD-10-CM

## 2020-07-25 DIAGNOSIS — Z12.31 ENCOUNTER FOR SCREENING MAMMOGRAM FOR BREAST CANCER: ICD-10-CM

## 2020-07-25 DIAGNOSIS — Z00.00 WELL ADULT EXAM: Primary | ICD-10-CM

## 2020-07-25 PROCEDURE — 99396 PREV VISIT EST AGE 40-64: CPT | Performed by: FAMILY MEDICINE

## 2020-07-25 NOTE — PROGRESS NOTES
Standard Visit   Assessment/Plan:     Visit Diagnosis:  Diagnoses and all orders for this visit:    Well adult exam    Encounter for screening mammogram for breast cancer  -     Mammo screening bilateral w cad; Future    Age-related osteoporosis without current pathological fracture  -     DXA bone density spine hip and pelvis; Future     Get your mammo done  DEXA ordered   Physical form completed  COVID precautions reviewed  Hang in there ill     Here for  physical    Subjective:    Patient ID: Jasmeet Rivero is a 61 y o  female  Patient is here with the following concerns:    Here for  physical   Doing well   Takes Lotrel 5/40 for hypertension  Takes hydrochlorothiazide 12 5 once a day for blood pressure           The following portions of the patient's history were reviewed and updated as appropriate: allergies, current medications, past family history, past medical history, past social history, past surgical history and problem list     Review of Systems   Constitutional: Negative for activity change and fever  HENT: Negative for nosebleeds and trouble swallowing  Eyes: Negative  Respiratory: Negative  Cardiovascular: Negative for palpitations  Gastrointestinal: Negative for abdominal pain, blood in stool and vomiting  Endocrine: Negative for cold intolerance  Genitourinary: Negative  Musculoskeletal: Negative for neck stiffness  Skin: Negative for pallor  Allergic/Immunologic: Negative for immunocompromised state  Neurological: Negative for seizures and facial asymmetry  Hematological: Negative for adenopathy  Psychiatric/Behavioral: Negative for agitation and suicidal ideas           /74   Pulse 94   Temp (!) 97 4 °F (36 3 °C) (Tympanic)   Resp 18   Ht 5' 5" (1 651 m)   Wt 111 kg (244 lb)   SpO2 99%   BMI 40 60 kg/m²   Social History     Socioeconomic History    Marital status: /Civil Union     Spouse name: Not on file    Number of children: Not on file    Years of education: Not on file    Highest education level: Not on file   Occupational History    Occupation: Self Employed   Social Needs    Financial resource strain: Not on file    Food insecurity:     Worry: Not on file     Inability: Not on file    Transportation needs:     Medical: Not on file     Non-medical: Not on file   Tobacco Use    Smoking status: Former Smoker    Smokeless tobacco: Never Used   Substance and Sexual Activity    Alcohol use: No    Drug use: No    Sexual activity: Not on file   Lifestyle    Physical activity:     Days per week: Not on file     Minutes per session: Not on file    Stress: Not on file   Relationships    Social connections:     Talks on phone: Not on file     Gets together: Not on file     Attends Faith service: Not on file     Active member of club or organization: Not on file     Attends meetings of clubs or organizations: Not on file     Relationship status: Not on file    Intimate partner violence:     Fear of current or ex partner: Not on file     Emotionally abused: Not on file     Physically abused: Not on file     Forced sexual activity: Not on file   Other Topics Concern    Not on file   Social History Narrative    Employed    Lives alone with help available    Never uses seat belt    Self Employed     Past Medical History:   Diagnosis Date    Essential hypertension     Resolved:4/21/2017     Family History   Problem Relation Age of Onset    Cancer Father         malignant neoplasm    Diabetes Cousin     Asthma Other         acute    Hypertension Other     Cancer Other         malignant neoplasm    Cancer Other         malignant neoplasm    Thyroid disease Family      Past Surgical History:   Procedure Laterality Date    DILATION AND CURETTAGE OF UTERUS      NO PAST SURGERIES      TUBAL LIGATION         Current Outpatient Medications:     amLODIPine-benazepril (LOTREL) 5-40 MG per capsule, Take 1 capsule by mouth daily, Disp: 90 capsule, Rfl: 1    fluticasone (FLONASE) 50 mcg/act nasal spray, 1 spray into each nostril daily, Disp: 16 g, Rfl: 0    hydrochlorothiazide (HYDRODIURIL) 12 5 mg tablet, TAKE 1 TABLET BY MOUTH EVERY DAY IN THE MORNING, Disp: 90 tablet, Rfl: 1    hydrocortisone 2 5 % cream, Apply topically 2 (two) times a day, Disp: 30 g, Rfl: 0    hydrOXYzine HCL (ATARAX) 10 mg tablet, Take 1 tablet (10 mg total) by mouth every 6 (six) hours as needed for itching (Patient not taking: Reported on 7/25/2020), Disp: 30 tablet, Rfl: 0    Lab Review   No visits with results within 6 Month(s) from this visit     Latest known visit with results is:   Admission on 03/17/2019, Discharged on 03/17/2019   Component Date Value    WBC 03/17/2019 7 10     RBC 03/17/2019 4 37     Hemoglobin 03/17/2019 13 0     Hematocrit 03/17/2019 40 1     MCV 03/17/2019 92     MCH 03/17/2019 29 7     MCHC 03/17/2019 32 4     RDW 03/17/2019 13 1     MPV 03/17/2019 9 9     Platelets 00/18/4705 287     nRBC 03/17/2019 0     Neutrophils Relative 03/17/2019 54     Immat GRANS % 03/17/2019 0     Lymphocytes Relative 03/17/2019 37     Monocytes Relative 03/17/2019 7     Eosinophils Relative 03/17/2019 2     Basophils Relative 03/17/2019 0     Neutrophils Absolute 03/17/2019 3 83     Immature Grans Absolute 03/17/2019 0 03     Lymphocytes Absolute 03/17/2019 2 60     Monocytes Absolute 03/17/2019 0 46     Eosinophils Absolute 03/17/2019 0 15     Basophils Absolute 03/17/2019 0 03     Sodium 03/17/2019 139     Potassium 03/17/2019 3 5     Chloride 03/17/2019 103     CO2 03/17/2019 29     ANION GAP 03/17/2019 7     BUN 03/17/2019 11     Creatinine 03/17/2019 0 87     Glucose 03/17/2019 155*    Calcium 03/17/2019 9 1     eGFR 03/17/2019 85     Total Bilirubin 03/17/2019 0 30     Bilirubin, Direct 03/17/2019 0 07     Alkaline Phosphatase 03/17/2019 73     AST 03/17/2019 13     ALT 03/17/2019 23     Total Protein 03/17/2019 7 7     Albumin 03/17/2019 3 3*    Lipase 03/17/2019 94     Color, UA 03/17/2019 Yellow     Clarity, UA 03/17/2019 Clear     Specific Gravity, UA 03/17/2019 >=1 030     pH, UA 03/17/2019 5 5     Leukocytes, UA 03/17/2019 Negative     Nitrite, UA 03/17/2019 Positive*    Protein, UA 03/17/2019 Negative     Glucose, UA 03/17/2019 Negative     Ketones, UA 03/17/2019 Negative     Urobilinogen, UA 03/17/2019 0 2     Bilirubin, UA 03/17/2019 Negative     Blood, UA 03/17/2019 Negative     RBC, UA 03/17/2019 2-4*    WBC, UA 03/17/2019 2-4*    Epithelial Cells 03/17/2019 Moderate*    Bacteria, UA 03/17/2019 Moderate*    Urine Culture 03/17/2019 >100,000 cfu/ml Escherichia coli*        Objective:     Physical Exam   Constitutional: She is oriented to person, place, and time  She appears well-developed and well-nourished  HENT:   Head: Normocephalic and atraumatic  Eyes: Pupils are equal, round, and reactive to light  Neck: Normal range of motion  Neck supple  Cardiovascular: Normal rate  Pulmonary/Chest: Effort normal and breath sounds normal    Musculoskeletal: Normal range of motion  Neurological: She is alert and oriented to person, place, and time  Skin: Skin is warm and dry  Psychiatric: She has a normal mood and affect  Nursing note and vitals reviewed  Social History     Socioeconomic History    Marital status: /Civil Union     Spouse name: Not on file    Number of children: Not on file    Years of education: Not on file    Highest education level: Not on file   Occupational History    Occupation: Self Employed   Social Needs    Financial resource strain: Not on file    Food insecurity:     Worry: Not on file     Inability: Not on file    Transportation needs:     Medical: Not on file     Non-medical: Not on file   Tobacco Use    Smoking status: Former Smoker    Smokeless tobacco: Never Used   Substance and Sexual Activity    Alcohol use:  No  Drug use: No    Sexual activity: Not on file   Lifestyle    Physical activity:     Days per week: Not on file     Minutes per session: Not on file    Stress: Not on file   Relationships    Social connections:     Talks on phone: Not on file     Gets together: Not on file     Attends Buddhism service: Not on file     Active member of club or organization: Not on file     Attends meetings of clubs or organizations: Not on file     Relationship status: Not on file    Intimate partner violence:     Fear of current or ex partner: Not on file     Emotionally abused: Not on file     Physically abused: Not on file     Forced sexual activity: Not on file   Other Topics Concern    Not on file   Social History Narrative    Employed    Lives alone with help available    Never uses seat belt    Self Employed     Past Medical History:   Diagnosis Date    Essential hypertension     Resolved:4/21/2017       Current Outpatient Medications:     amLODIPine-benazepril (LOTREL) 5-40 MG per capsule, Take 1 capsule by mouth daily, Disp: 90 capsule, Rfl: 1    fluticasone (FLONASE) 50 mcg/act nasal spray, 1 spray into each nostril daily, Disp: 16 g, Rfl: 0    hydrochlorothiazide (HYDRODIURIL) 12 5 mg tablet, TAKE 1 TABLET BY MOUTH EVERY DAY IN THE MORNING, Disp: 90 tablet, Rfl: 1    hydrocortisone 2 5 % cream, Apply topically 2 (two) times a day, Disp: 30 g, Rfl: 0    hydrOXYzine HCL (ATARAX) 10 mg tablet, Take 1 tablet (10 mg total) by mouth every 6 (six) hours as needed for itching (Patient not taking: Reported on 7/25/2020), Disp: 30 tablet, Rfl: 0  Family History   Problem Relation Age of Onset    Cancer Father         malignant neoplasm    Diabetes Cousin     Asthma Other         acute    Hypertension Other     Cancer Other         malignant neoplasm    Cancer Other         malignant neoplasm    Thyroid disease Family        There are no Patient Instructions on file for this visit          CLEMENTINE Burton

## 2020-09-04 ENCOUNTER — OFFICE VISIT (OUTPATIENT)
Dept: FAMILY MEDICINE CLINIC | Facility: CLINIC | Age: 60
End: 2020-09-04
Payer: COMMERCIAL

## 2020-09-04 VITALS
TEMPERATURE: 97.2 F | HEART RATE: 79 BPM | HEIGHT: 65 IN | RESPIRATION RATE: 18 BRPM | WEIGHT: 246 LBS | OXYGEN SATURATION: 97 % | DIASTOLIC BLOOD PRESSURE: 70 MMHG | BODY MASS INDEX: 40.98 KG/M2 | SYSTOLIC BLOOD PRESSURE: 120 MMHG

## 2020-09-04 DIAGNOSIS — M79.672 LEFT FOOT PAIN: ICD-10-CM

## 2020-09-04 DIAGNOSIS — M79.89 LEG SWELLING: Primary | ICD-10-CM

## 2020-09-04 PROCEDURE — 99213 OFFICE O/P EST LOW 20 MIN: CPT | Performed by: NURSE PRACTITIONER

## 2020-09-04 RX ORDER — PREDNISONE 20 MG/1
20 TABLET ORAL DAILY
Qty: 5 TABLET | Refills: 0 | Status: SHIPPED | OUTPATIENT
Start: 2020-09-04 | End: 2021-09-27 | Stop reason: ALTCHOICE

## 2020-09-04 RX ORDER — TRAMADOL HYDROCHLORIDE 50 MG/1
50 TABLET ORAL EVERY 6 HOURS PRN
Qty: 20 TABLET | Refills: 0 | Status: SHIPPED | OUTPATIENT
Start: 2020-09-04 | End: 2021-09-27 | Stop reason: ALTCHOICE

## 2020-09-04 NOTE — PATIENT INSTRUCTIONS
Prednisone daily for 5 days start in the morning   take tramadol at night to help with pain take Tylenol every 6 hours during the day elevate and compress  Get x-ray done to monitor for any bone spur is a will evaluate the need to send a podiatrist

## 2020-09-04 NOTE — PROGRESS NOTES
Assessment/Plan:     Left foot pain   Mild edema noted  Tender to touch  Patient is unable to bear weight  Discussed plantar fascitis versus bone spur with patient  Will get x-ray done  Prednisone ordered for inflammation  Tramadol ordered for acute pain  Discussed tramadol is short-term  Encouraged to elevate and compress  Problem List Items Addressed This Visit        Other    Left foot pain      Mild edema noted  Tender to touch  Patient is unable to bear weight  Discussed plantar fascitis versus bone spur with patient  Will get x-ray done  Prednisone ordered for inflammation  Tramadol ordered for acute pain  Discussed tramadol is short-term  Encouraged to elevate and compress  Relevant Medications    traMADol (ULTRAM) 50 mg tablet      Other Visit Diagnoses     Leg swelling    -  Primary    Relevant Medications    predniSONE 20 mg tablet    Other Relevant Orders    XR foot 3+ vw left            Subjective:      Patient ID: Tiburcio Billingsley is a 61 y o  female  Is here with complaints of left ankle foot pain  Reports that is very difficult for her to walk  Denies any numbness or tingling  States that the pain is 8/10  The following portions of the patient's history were reviewed and updated as appropriate: allergies, current medications, past family history, past medical history, past social history, past surgical history and problem list     Review of Systems   Constitutional: Negative  HENT: Negative  Eyes: Negative  Respiratory: Negative  Cardiovascular: Negative  Gastrointestinal: Negative  Endocrine: Negative  Genitourinary: Negative  Musculoskeletal: Positive for arthralgias (  Left heel foot)  Allergic/Immunologic: Negative  Neurological: Negative  Psychiatric/Behavioral: Negative            Objective:      /70   Pulse 79   Temp (!) 97 2 °F (36 2 °C) (Tympanic)   Resp 18   Ht 5' 5" (1 651 m)   Wt 112 kg (246 lb)   SpO2 97% BMI 40 94 kg/m²          Physical Exam  Vitals signs and nursing note reviewed  Constitutional:       Appearance: She is well-developed  HENT:      Head: Normocephalic and atraumatic  Right Ear: External ear normal       Left Ear: External ear normal    Eyes:      Pupils: Pupils are equal, round, and reactive to light  Neck:      Musculoskeletal: Normal range of motion  Cardiovascular:      Rate and Rhythm: Normal rate and regular rhythm  Pulmonary:      Effort: Pulmonary effort is normal    Abdominal:      General: Bowel sounds are normal       Palpations: Abdomen is soft  Musculoskeletal: Normal range of motion  General: Swelling and tenderness present  No deformity or signs of injury  Left lower leg: Edema present  Skin:     General: Skin is warm and dry  Neurological:      Mental Status: She is alert and oriented to person, place, and time             Labs:    Lab Results   Component Value Date    WBC 7 10 03/17/2019    HGB 13 0 03/17/2019    HCT 40 1 03/17/2019    MCV 92 03/17/2019     03/17/2019     Lab Results   Component Value Date    K 3 5 03/17/2019     03/17/2019    CO2 29 03/17/2019    BUN 11 03/17/2019    CREATININE 0 87 03/17/2019    CALCIUM 9 1 03/17/2019    AST 13 03/17/2019    ALT 23 03/17/2019    ALKPHOS 73 03/17/2019    EGFR 85 03/17/2019     Lab Results   Component Value Date    CALCIUM 9 1 03/17/2019    K 3 5 03/17/2019    CO2 29 03/17/2019     03/17/2019    BUN 11 03/17/2019    CREATININE 0 87 03/17/2019

## 2020-09-08 ENCOUNTER — TELEPHONE (OUTPATIENT)
Dept: FAMILY MEDICINE CLINIC | Facility: CLINIC | Age: 60
End: 2020-09-08

## 2020-09-08 PROBLEM — M79.672 LEFT FOOT PAIN: Status: ACTIVE | Noted: 2020-09-08

## 2020-09-08 NOTE — TELEPHONE ENCOUNTER
She called to see if her x-ray results came in yet, but I don't see them in her chart  She also said she wanted a disability parking plate for her car, a sticker  You filled out a disability handicapped form for a motor vehicle  She wanted to know if you could fill out the other one and she could come pick it up

## 2020-09-08 NOTE — TELEPHONE ENCOUNTER
If   She drops off the form I will fill it out for her  Please also find out if she got her x-ray done I do not see anything pending    Thank you

## 2020-09-08 NOTE — ASSESSMENT & PLAN NOTE
Mild edema noted  Tender to touch  Patient is unable to bear weight  Discussed plantar fascitis versus bone spur with patient  Will get x-ray done  Prednisone ordered for inflammation  Tramadol ordered for acute pain  Discussed tramadol is short-term  Encouraged to elevate and compress

## 2020-09-08 NOTE — TELEPHONE ENCOUNTER
She called back again about the form  She would like to know if you want her to drop the form off and you can fill it out  I was not sure if we have them to be filled out  It is a parking disability place card

## 2020-09-08 NOTE — TELEPHONE ENCOUNTER
She is going to drop off the form today and she said she got her x-ray done at [facility] on Saturday morning

## 2020-09-09 ENCOUNTER — TELEPHONE (OUTPATIENT)
Dept: FAMILY MEDICINE CLINIC | Facility: CLINIC | Age: 60
End: 2020-09-09

## 2020-09-09 DIAGNOSIS — M77.50 BONE SPUR OF FOOT: ICD-10-CM

## 2020-09-09 DIAGNOSIS — M79.672 FOOT PAIN, LEFT: Primary | ICD-10-CM

## 2020-09-09 RX ORDER — MELOXICAM 15 MG/1
15 TABLET ORAL DAILY
Qty: 30 TABLET | Refills: 0 | Status: SHIPPED | OUTPATIENT
Start: 2020-09-09 | End: 2021-09-27 | Stop reason: ALTCHOICE

## 2020-09-09 NOTE — TELEPHONE ENCOUNTER
Called patient and left message that I will mail her Podiatry referral to her  Also, that she can call the office to schedule a pap with Zamzam

## 2020-09-09 NOTE — TELEPHONE ENCOUNTER
Spoke with patient  Aware of x-ray results  Discussed pain management  Discussed wearing comfortable footwear  Referral made to Podiatry

## 2020-10-30 ENCOUNTER — TELEPHONE (OUTPATIENT)
Dept: FAMILY MEDICINE CLINIC | Facility: CLINIC | Age: 60
End: 2020-10-30

## 2020-12-10 ENCOUNTER — TELEMEDICINE (OUTPATIENT)
Dept: FAMILY MEDICINE CLINIC | Facility: CLINIC | Age: 60
End: 2020-12-10
Payer: COMMERCIAL

## 2020-12-10 DIAGNOSIS — N30.00 ACUTE CYSTITIS WITHOUT HEMATURIA: Primary | ICD-10-CM

## 2020-12-10 PROCEDURE — 99213 OFFICE O/P EST LOW 20 MIN: CPT | Performed by: NURSE PRACTITIONER

## 2020-12-14 ENCOUNTER — TELEPHONE (OUTPATIENT)
Dept: FAMILY MEDICINE CLINIC | Facility: CLINIC | Age: 60
End: 2020-12-14

## 2020-12-14 ENCOUNTER — CLINICAL SUPPORT (OUTPATIENT)
Dept: FAMILY MEDICINE CLINIC | Facility: CLINIC | Age: 60
End: 2020-12-14
Payer: COMMERCIAL

## 2020-12-14 DIAGNOSIS — N30.00 ACUTE CYSTITIS WITHOUT HEMATURIA: Primary | ICD-10-CM

## 2020-12-14 LAB
SL AMB  POCT GLUCOSE, UA: NORMAL
SL AMB LEUKOCYTE ESTERASE,UA: NORMAL
SL AMB POCT BILIRUBIN,UA: NORMAL
SL AMB POCT BLOOD,UA: NORMAL
SL AMB POCT CLARITY,UA: NORMAL
SL AMB POCT COLOR,UA: YELLOW
SL AMB POCT KETONES,UA: 5
SL AMB POCT NITRITE,UA: NORMAL
SL AMB POCT PH,UA: 6
SL AMB POCT SPECIFIC GRAVITY,UA: 1.02
SL AMB POCT URINE PROTEIN: 15
SL AMB POCT UROBILINOGEN: 0.2

## 2020-12-14 PROCEDURE — 81002 URINALYSIS NONAUTO W/O SCOPE: CPT

## 2020-12-15 ENCOUNTER — TELEPHONE (OUTPATIENT)
Dept: FAMILY MEDICINE CLINIC | Facility: CLINIC | Age: 60
End: 2020-12-15

## 2020-12-21 DIAGNOSIS — I10 ESSENTIAL HYPERTENSION: ICD-10-CM

## 2020-12-21 DIAGNOSIS — R60.9 EDEMA, UNSPECIFIED TYPE: ICD-10-CM

## 2020-12-22 RX ORDER — HYDROCHLOROTHIAZIDE 12.5 MG/1
TABLET ORAL
Qty: 90 TABLET | Refills: 1 | Status: SHIPPED | OUTPATIENT
Start: 2020-12-22 | End: 2021-01-18 | Stop reason: SDUPTHER

## 2020-12-22 RX ORDER — AMLODIPINE BESYLATE AND BENAZEPRIL HYDROCHLORIDE 5; 40 MG/1; MG/1
CAPSULE ORAL
Qty: 90 CAPSULE | Refills: 1 | Status: SHIPPED | OUTPATIENT
Start: 2020-12-22 | End: 2021-07-26 | Stop reason: SDUPTHER

## 2021-01-18 DIAGNOSIS — R60.9 EDEMA, UNSPECIFIED TYPE: ICD-10-CM

## 2021-01-18 RX ORDER — HYDROCHLOROTHIAZIDE 12.5 MG/1
12.5 TABLET ORAL EVERY MORNING
Qty: 90 TABLET | Refills: 1 | Status: SHIPPED | OUTPATIENT
Start: 2021-01-18 | End: 2021-07-16

## 2021-01-18 NOTE — TELEPHONE ENCOUNTER
Could you please resend this  The pharmacy is stating they never received this back in December and the patient took her last one today   Since Dolsie is not in today could you send this in?

## 2021-07-13 DIAGNOSIS — R60.9 EDEMA, UNSPECIFIED TYPE: ICD-10-CM

## 2021-07-16 RX ORDER — HYDROCHLOROTHIAZIDE 12.5 MG/1
TABLET ORAL
Qty: 90 TABLET | Refills: 1 | Status: SHIPPED | OUTPATIENT
Start: 2021-07-16 | End: 2022-01-21

## 2021-07-26 ENCOUNTER — RA CDI HCC (OUTPATIENT)
Dept: OTHER | Facility: HOSPITAL | Age: 61
End: 2021-07-26

## 2021-07-26 DIAGNOSIS — I10 ESSENTIAL HYPERTENSION: ICD-10-CM

## 2021-07-26 RX ORDER — AMLODIPINE BESYLATE AND BENAZEPRIL HYDROCHLORIDE 5; 40 MG/1; MG/1
1 CAPSULE ORAL DAILY
Qty: 90 CAPSULE | Refills: 1 | Status: SHIPPED | OUTPATIENT
Start: 2021-07-26 | End: 2021-10-28

## 2021-07-26 NOTE — TELEPHONE ENCOUNTER
Patient is requesting amLODIPine-benazepril (LOTREL) 5-40 MG per capsule [[id number]]  She stated they sent the wrong one by accident, please send as soon as possible she has ran out  Thank you

## 2021-07-26 NOTE — PROGRESS NOTES
[facility]  coding opportunities             Chart reviewed, (number of) suggestions sent to provider: 1               Number of suggestions NOT actually used: 1  * telemed visit with video- no BMI could be taked  Patients insurance company: [address]  (Medicare Advantage and Commercial)     Visit status: Patient arrived for their scheduled appointment     Provider never responded to [place] Rolith  coding request     [facility]  coding opportunities             Chart reviewed, (number of) suggestions sent to provider: 1     E66 01 Morbid, severe,obesity due to excess calories [facility])     If this is correct, please document and assess at your next visit 7/30/21                 Patients insurance company: [address]  (Medicare Advantage and Commercial)

## 2021-07-26 NOTE — TELEPHONE ENCOUNTER
Pt called a second time,  I informed the pt the message from this morning was sent to the provider  It has be signed off on

## 2021-07-30 ENCOUNTER — TELEMEDICINE (OUTPATIENT)
Dept: FAMILY MEDICINE CLINIC | Facility: CLINIC | Age: 61
End: 2021-07-30
Payer: COMMERCIAL

## 2021-07-30 DIAGNOSIS — Z12.11 SCREENING FOR MALIGNANT NEOPLASM OF COLON: ICD-10-CM

## 2021-07-30 DIAGNOSIS — Z20.822 CLOSE EXPOSURE TO COVID-19 VIRUS: Primary | ICD-10-CM

## 2021-07-30 DIAGNOSIS — Z12.31 SCREENING MAMMOGRAM, ENCOUNTER FOR: ICD-10-CM

## 2021-07-30 PROCEDURE — 99213 OFFICE O/P EST LOW 20 MIN: CPT | Performed by: NURSE PRACTITIONER

## 2021-07-30 NOTE — PROGRESS NOTES
Virtual Regular Visit    Verification of patient location:    Patient is located in the following state in which I hold an active license [place]      Assessment/Plan:    Problem List Items Addressed This Visit        Other    Close exposure to COVID-19 virus - Primary       Patient had a close COVID exposure  Testing date would be August 1st   Advised to come into the office on the 2nd  Patient will try to get it done at [facility] and will let us know the results  Continue quarantine  Increase fluid hydration, vitamin-C vitamin-D intake  Relevant Orders    Novel Coronavirus (Covid-19),PCR [facility] - Collected in Office      Other Visit Diagnoses     Screening for malignant neoplasm of colon        Screening mammogram, encounter for                   Reason for visit is No chief complaint on file  Encounter provider CLEMENTINE Jarvis    Provider located at [facility] [address] [address] [phone]      Recent Visits  No visits were found meeting these conditions  Showing recent visits within past 7 days and meeting all other requirements  Today's Visits  Date Type Provider Dept   07/30/21 Telemedicine Zamzam [address] today's visits and meeting all other requirements  Future Appointments  No visits were found meeting these conditions  Showing future appointments within next 150 days and meeting all other requirements       The patient was identified by name and date of birth  Tasia Powers was informed that this is a telemedicine visit and that the visit is being conducted through [address] Now and patient was informed that this is a secure, HIPAA-compliant platform  She agrees to proceed     My office door was closed  No one else was in the room  She acknowledged consent and understanding of privacy and security of the video platform   The patient has agreed to participate and understands they can discontinue the visit at any time  Patient is aware this is a billable service  Irais Cano is a 61 y o  female  Is be seen for virtual with complaints of COVID exposure  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 who was positive  Patient called department of Health  Needs to be tested by the 1st   Currently the  is closed  She is in quarantine  Patient is not symptomatic  Past Medical History:   Diagnosis Date    Essential hypertension     Resolved:4/21/2017       Past Surgical History:   Procedure Laterality Date    DILATION AND CURETTAGE OF UTERUS      NO PAST SURGERIES      TUBAL LIGATION         Current Outpatient Medications   Medication Sig Dispense Refill    amLODIPine-benazepril (LOTREL) 5-40 MG per capsule Take 1 capsule by mouth daily 90 capsule 1    fluticasone (FLONASE) 50 mcg/act nasal spray 1 spray into each nostril daily 16 g 0    hydrochlorothiazide (HYDRODIURIL) 12 5 mg tablet TAKE 1 TABLET BY MOUTH EVERY MORNING 90 tablet 1    hydrocortisone 2 5 % cream Apply topically 2 (two) times a day 30 g 0    hydrOXYzine HCL (ATARAX) 10 mg tablet Take 1 tablet (10 mg total) by mouth every 6 (six) hours as needed for itching 30 tablet 0    meloxicam (MOBIC) 15 mg tablet Take 1 tablet (15 mg total) by mouth daily 30 tablet 0    predniSONE 20 mg tablet Take 1 tablet (20 mg total) by mouth daily 5 tablet 0    traMADol (ULTRAM) 50 mg tablet Take 1 tablet (50 mg total) by mouth every 6 (six) hours as needed for moderate pain 20 tablet 0     No current facility-administered medications for this visit  No Known Allergies    Review of Systems   Constitutional: Negative for fatigue and fever  HENT: Negative for congestion  Eyes: Negative  Respiratory: Negative for cough and shortness of breath  Gastrointestinal: Negative  Genitourinary: Negative  Musculoskeletal: Negative      Skin: Negative  Allergic/Immunologic: Negative  Neurological: Negative  Hematological: Negative  Psychiatric/Behavioral: Negative  Video Exam    There were no vitals filed for this visit  Physical Exam  Constitutional:       Appearance: She is well-developed  HENT:      Head: Normocephalic and atraumatic  Pulmonary:      Effort: Pulmonary effort is normal    Musculoskeletal:         General: Normal range of motion  Cervical back: Normal range of motion and neck supple  Skin:     General: Skin is dry  Neurological:      Mental Status: She is alert and oriented to person, place, and time  Psychiatric:         Behavior: Behavior normal          Thought Content: Thought content normal          Judgment: Judgment normal           I spent 15 minutes directly with the patient during this visit    VIRTUAL VISIT DISCLAIMER      Shardain Priya verbally agrees to participate in [employer]  Pt is aware that [employer] could be limited without vital signs or the ability to perform a full hands-on physical Gwinda Current understands she or the provider may request at any time to terminate the video visit and request the patient to seek care or treatment in person

## 2021-07-30 NOTE — ASSESSMENT & PLAN NOTE
Patient had a close COVID exposure  Testing date would be August 1st   Advised to come into the office on the 2nd  Patient will try to get it done at [facility] and will let us know the results  Continue quarantine  Increase fluid hydration, vitamin-C vitamin-D intake

## 2021-08-02 PROCEDURE — U0003 INFECTIOUS AGENT DETECTION BY NUCLEIC ACID (DNA OR RNA); SEVERE ACUTE RESPIRATORY SYNDROME CORONAVIRUS 2 (SARS-COV-2) (CORONAVIRUS DISEASE [COVID-19]), AMPLIFIED PROBE TECHNIQUE, MAKING USE OF HIGH THROUGHPUT TECHNOLOGIES AS DESCRIBED BY CMS-2020-01-R: HCPCS | Performed by: NURSE PRACTITIONER

## 2021-08-02 PROCEDURE — U0005 INFEC AGEN DETEC AMPLI PROBE: HCPCS | Performed by: NURSE PRACTITIONER

## 2021-08-03 LAB — SARS-COV-2 RNA RESP QL NAA+PROBE: NEGATIVE

## 2021-09-21 ENCOUNTER — TELEMEDICINE (OUTPATIENT)
Dept: FAMILY MEDICINE CLINIC | Facility: CLINIC | Age: 61
End: 2021-09-21
Payer: COMMERCIAL

## 2021-09-21 ENCOUNTER — HOSPITAL ENCOUNTER (OUTPATIENT)
Dept: RADIOLOGY | Facility: HOSPITAL | Age: 61
Discharge: HOME | End: 2021-09-21
Payer: COMMERCIAL

## 2021-09-21 DIAGNOSIS — R05.8 PRODUCTIVE COUGH: Primary | ICD-10-CM

## 2021-09-21 DIAGNOSIS — R53.1 WEAKNESS: ICD-10-CM

## 2021-09-21 DIAGNOSIS — Z12.31 SCREENING MAMMOGRAM, ENCOUNTER FOR: ICD-10-CM

## 2021-09-21 DIAGNOSIS — M54.50 ACUTE BILATERAL LOW BACK PAIN, UNSPECIFIED WHETHER SCIATICA PRESENT: ICD-10-CM

## 2021-09-21 DIAGNOSIS — Z00.00 HEALTHCARE MAINTENANCE: ICD-10-CM

## 2021-09-21 DIAGNOSIS — R05.8 PRODUCTIVE COUGH: ICD-10-CM

## 2021-09-21 DIAGNOSIS — Z12.11 SCREENING FOR MALIGNANT NEOPLASM OF COLON: ICD-10-CM

## 2021-09-21 PROCEDURE — 99213 OFFICE O/P EST LOW 20 MIN: CPT | Performed by: NURSE PRACTITIONER

## 2021-09-21 PROCEDURE — 71046 X-RAY EXAM CHEST 2 VIEWS: CPT

## 2021-09-21 RX ORDER — AZITHROMYCIN 250 MG/1
TABLET, FILM COATED ORAL
Qty: 6 TABLET | Refills: 0 | Status: SHIPPED | OUTPATIENT
Start: 2021-09-21 | End: 2021-09-25

## 2021-09-21 NOTE — PROGRESS NOTES
Virtual Regular Visit    Verification of patient location:    Patient is located in the following state in which I hold an active license [place]      Assessment/Plan:    Problem List Items Addressed This Visit        Other    Productive cough - Primary     Productive cough for more than 2 weeks  Chest x-ray ordered  Zithromax therapy  Increase fluid hydration  Blood work ordered  Advised if symptoms worsen call office or go to ER  Relevant Medications    azithromycin (ZITHROMAX) 250 mg tablet    Other Relevant Orders    XR chest pa & lateral    CBC and differential    Comprehensive metabolic panel    Acute bilateral low back pain     Assessment because of virtual visit  Discussed the possibility of a urinary tract infection  Urinalysis ordered  Also discussed management of constipation  Patient denies any injury  Will have to make appointment to do physical assessment in office           Relevant Orders    UA w Reflex to Microscopic w Reflex to Culture -Lab Collect      Other Visit Diagnoses     Screening mammogram, encounter for        Relevant Orders    Mammo screening bilateral w 3d & cad    Screening for malignant neoplasm of colon        Relevant Orders    Ambulatory referral to Gastroenterology    Weakness        Relevant Orders    XR chest pa & lateral    UA w Reflex to Microscopic w Reflex to Culture -Lab Collect    CBC and differential    Comprehensive metabolic panel    Healthcare maintenance        Relevant Orders    Lipid panel               Reason for visit is   Chief Complaint   Patient presents with    Virtual Regular Visit        Encounter provider CLEMENTINE Morales    Provider located at [address] [address] [phone]      Recent Visits  Date Type Provider Dept   09/21/21 Telemedicine Zamzam Pitt recent visits within past 7 days and meeting all other requirements  Future Appointments  No visits were found meeting these conditions  Showing future appointments within next 150 days and meeting all other requirements       The patient was identified by name and date of birth  Alison Sheridan was informed that this is a telemedicine visit and that the visit is being conducted through [address] Now and patient was informed that this is a secure, HIPAA-compliant platform  She agrees to proceed     My office door was closed  No one else was in the room  She acknowledged consent and understanding of privacy and security of the video platform  The patient has agreed to participate and understands they can discontinue the visit at any time  Patient is aware this is a billable service  Subjective  Alison Sheridan is a 61 y o  female patient is being seen for virtual with multiple complaints of congestion sinus pain and pressure also has lower back pain some constipation and nausea  Symptoms have been ongoing for more than 2 weeks  Recently tested for COVID and was negative  Reports that she has been maintaining precautions  States she has a productive cough  Is like her back pain is being caused by constipation   Patient is being seen for virtual visit with multiple complaints  Has cough sinus congestion lower back pain can constipation  Feels her symptoms are caused by her sinus infection  Ongoing for more than 2 weeks  Denies any muscle ache fatigue aches  Denies any headache or change in appetite    Reports constipation, no diarrhea       Past Medical History:   Diagnosis Date    Essential hypertension     Resolved:4/21/2017       Past Surgical History:   Procedure Laterality Date    DILATION AND CURETTAGE OF UTERUS      NO PAST SURGERIES      TUBAL LIGATION         Current Outpatient Medications   Medication Sig Dispense Refill    amLODIPine-benazepril (LOTREL) 5-40 MG per capsule Take 1 capsule by mouth daily 90 capsule 1  azithromycin (ZITHROMAX) 250 mg tablet Take 2 tablets today then 1 tablet daily x 4 days 6 tablet 0    fluticasone (FLONASE) 50 mcg/act nasal spray 1 spray into each nostril daily 16 g 0    hydrochlorothiazide (HYDRODIURIL) 12 5 mg tablet TAKE 1 TABLET BY MOUTH EVERY MORNING 90 tablet 1    hydrocortisone 2 5 % cream Apply topically 2 (two) times a day 30 g 0    hydrOXYzine HCL (ATARAX) 10 mg tablet Take 1 tablet (10 mg total) by mouth every 6 (six) hours as needed for itching 30 tablet 0    meloxicam (MOBIC) 15 mg tablet Take 1 tablet (15 mg total) by mouth daily 30 tablet 0    predniSONE 20 mg tablet Take 1 tablet (20 mg total) by mouth daily 5 tablet 0    traMADol (ULTRAM) 50 mg tablet Take 1 tablet (50 mg total) by mouth every 6 (six) hours as needed for moderate pain 20 tablet 0     No current facility-administered medications for this visit  No Known Allergies    Review of Systems   HENT: Positive for congestion, sinus pressure and sinus pain  Respiratory: Positive for cough and wheezing  Gastrointestinal: Positive for constipation and nausea  Genitourinary: Dyspareunia: zithro  Musculoskeletal: Positive for back pain (groin pain)  Video Exam    There were no vitals filed for this visit  Physical Exam  Constitutional:       Appearance: She is well-developed  HENT:      Head: Normocephalic and atraumatic  Nose: Congestion present  Eyes:      General:         Right eye: No discharge  Left eye: No discharge  Pulmonary:      Effort: Pulmonary effort is normal  No respiratory distress  Chest:      Chest wall: No tenderness  Abdominal:      General: There is no distension  Tenderness: There is no abdominal tenderness  Musculoskeletal:         General: Normal range of motion  Cervical back: Normal range of motion and neck supple  Skin:     General: Skin is dry     Neurological:      Mental Status: She is alert and oriented to person, place, and time    Psychiatric:         Behavior: Behavior normal          Thought Content: Thought content normal          Judgment: Judgment normal           I spent 15 minutes directly with the patient during this visit    VIRTUAL VISIT DISCLAIMER      Anthony Vale verbally agrees to participate in [employer]  Pt is aware that [employer] could be limited without vital signs or the ability to perform a full hands-on physical Yelena Copper understands she or the provider may request at any time to terminate the video visit and request the patient to seek care or treatment in person

## 2021-09-22 ENCOUNTER — APPOINTMENT (OUTPATIENT)
Dept: LAB | Facility: HOSPITAL | Age: 61
End: 2021-09-22
Payer: COMMERCIAL

## 2021-09-22 DIAGNOSIS — M54.50 ACUTE BILATERAL LOW BACK PAIN, UNSPECIFIED WHETHER SCIATICA PRESENT: ICD-10-CM

## 2021-09-22 DIAGNOSIS — Z00.00 HEALTHCARE MAINTENANCE: ICD-10-CM

## 2021-09-22 DIAGNOSIS — I10 ESSENTIAL HYPERTENSION: ICD-10-CM

## 2021-09-22 DIAGNOSIS — R53.1 WEAKNESS: ICD-10-CM

## 2021-09-22 DIAGNOSIS — R05.8 PRODUCTIVE COUGH: ICD-10-CM

## 2021-09-22 LAB
ALBUMIN SERPL BCP-MCNC: 3.2 G/DL (ref 3.5–5)
ALP SERPL-CCNC: 55 U/L (ref 46–116)
ALT SERPL W P-5'-P-CCNC: 34 U/L (ref 12–78)
ANION GAP SERPL CALCULATED.3IONS-SCNC: 6 MMOL/L (ref 4–13)
AST SERPL W P-5'-P-CCNC: 25 U/L (ref 5–45)
BACTERIA UR QL AUTO: ABNORMAL /HPF
BASOPHILS # BLD MANUAL: 0 THOUSAND/UL (ref 0–0.1)
BASOPHILS NFR MAR MANUAL: 0 % (ref 0–1)
BILIRUB SERPL-MCNC: 0.66 MG/DL (ref 0.2–1)
BILIRUB UR QL STRIP: NEGATIVE
BUN SERPL-MCNC: 11 MG/DL (ref 5–25)
CALCIUM ALBUM COR SERPL-MCNC: 9.4 MG/DL (ref 8.3–10.1)
CALCIUM SERPL-MCNC: 8.8 MG/DL (ref 8.3–10.1)
CHLORIDE SERPL-SCNC: 99 MMOL/L (ref 100–108)
CHOLEST SERPL-MCNC: 177 MG/DL (ref 50–200)
CLARITY UR: ABNORMAL
CO2 SERPL-SCNC: 34 MMOL/L (ref 21–32)
COLOR UR: YELLOW
CREAT SERPL-MCNC: 0.93 MG/DL (ref 0.6–1.3)
EOSINOPHIL # BLD MANUAL: 0 THOUSAND/UL (ref 0–0.4)
EOSINOPHIL NFR BLD MANUAL: 0 % (ref 0–6)
ERYTHROCYTE [DISTWIDTH] IN BLOOD BY AUTOMATED COUNT: 12.8 % (ref 11.6–15.1)
GFR SERPL CREATININE-BSD FRML MDRD: 77 ML/MIN/1.73SQ M
GLUCOSE P FAST SERPL-MCNC: 103 MG/DL (ref 65–99)
GLUCOSE UR STRIP-MCNC: NEGATIVE MG/DL
HCT VFR BLD AUTO: 37.8 % (ref 34.8–46.1)
HDLC SERPL-MCNC: 40 MG/DL
HGB BLD-MCNC: 12.7 G/DL (ref 11.5–15.4)
HGB UR QL STRIP.AUTO: NEGATIVE
HYALINE CASTS #/AREA URNS LPF: ABNORMAL /LPF
KETONES UR STRIP-MCNC: ABNORMAL MG/DL
LDLC SERPL CALC-MCNC: 122 MG/DL (ref 0–100)
LEUKOCYTE ESTERASE UR QL STRIP: ABNORMAL
LYMPHOCYTES # BLD AUTO: 1.06 THOUSAND/UL (ref 0.6–4.47)
LYMPHOCYTES # BLD AUTO: 20 % (ref 14–44)
MCH RBC QN AUTO: 30.1 PG (ref 26.8–34.3)
MCHC RBC AUTO-ENTMCNC: 33.6 G/DL (ref 31.4–37.4)
MCV RBC AUTO: 90 FL (ref 82–98)
MONOCYTES # BLD AUTO: 0.32 THOUSAND/UL (ref 0–1.22)
MONOCYTES NFR BLD: 6 % (ref 4–12)
NEUTROPHILS # BLD MANUAL: 3.94 THOUSAND/UL (ref 1.85–7.62)
NEUTS BAND NFR BLD MANUAL: 2 % (ref 0–8)
NEUTS SEG NFR BLD AUTO: 72 % (ref 43–75)
NITRITE UR QL STRIP: NEGATIVE
NON-SQ EPI CELLS URNS QL MICRO: ABNORMAL /HPF
NONHDLC SERPL-MCNC: 137 MG/DL
PH UR STRIP.AUTO: 6 [PH]
PLATELET # BLD AUTO: 227 THOUSANDS/UL (ref 149–390)
PLATELET BLD QL SMEAR: ADEQUATE
PMV BLD AUTO: 10.4 FL (ref 8.9–12.7)
POTASSIUM SERPL-SCNC: 3.3 MMOL/L (ref 3.5–5.3)
PROT SERPL-MCNC: 7.6 G/DL (ref 6.4–8.2)
PROT UR STRIP-MCNC: ABNORMAL MG/DL
RBC # BLD AUTO: 4.22 MILLION/UL (ref 3.81–5.12)
RBC #/AREA URNS AUTO: ABNORMAL /HPF
RBC MORPH BLD: NORMAL
SODIUM SERPL-SCNC: 139 MMOL/L (ref 136–145)
SP GR UR STRIP.AUTO: 1.01 (ref 1–1.03)
TRIGL SERPL-MCNC: 73 MG/DL
UROBILINOGEN UR QL STRIP.AUTO: 4 E.U./DL
WBC # BLD AUTO: 5.32 THOUSAND/UL (ref 4.31–10.16)
WBC #/AREA URNS AUTO: ABNORMAL /HPF

## 2021-09-22 PROCEDURE — 36415 COLL VENOUS BLD VENIPUNCTURE: CPT

## 2021-09-22 PROCEDURE — 80053 COMPREHEN METABOLIC PANEL: CPT

## 2021-09-22 PROCEDURE — 80061 LIPID PANEL: CPT

## 2021-09-22 PROCEDURE — 85007 BL SMEAR W/DIFF WBC COUNT: CPT

## 2021-09-22 PROCEDURE — 81001 URINALYSIS AUTO W/SCOPE: CPT

## 2021-09-22 PROCEDURE — 85027 COMPLETE CBC AUTOMATED: CPT

## 2021-09-22 NOTE — ASSESSMENT & PLAN NOTE
Assessment because of virtual visit  Discussed the possibility of a urinary tract infection  Urinalysis ordered  Also discussed management of constipation  Patient denies any injury  Will have to make appointment to do physical assessment in office

## 2021-09-22 NOTE — ASSESSMENT & PLAN NOTE
Productive cough for more than 2 weeks  Chest x-ray ordered  Zithromax therapy  Increase fluid hydration  Blood work ordered  Advised if symptoms worsen call office or go to ER

## 2021-09-23 ENCOUNTER — TELEPHONE (OUTPATIENT)
Dept: FAMILY MEDICINE CLINIC | Facility: CLINIC | Age: 61
End: 2021-09-23

## 2021-09-24 ENCOUNTER — TELEPHONE (OUTPATIENT)
Dept: FAMILY MEDICINE CLINIC | Facility: CLINIC | Age: 61
End: 2021-09-24

## 2021-09-24 PROCEDURE — U0005 INFEC AGEN DETEC AMPLI PROBE: HCPCS | Performed by: NURSE PRACTITIONER

## 2021-09-24 PROCEDURE — U0003 INFECTIOUS AGENT DETECTION BY NUCLEIC ACID (DNA OR RNA); SEVERE ACUTE RESPIRATORY SYNDROME CORONAVIRUS 2 (SARS-COV-2) (CORONAVIRUS DISEASE [COVID-19]), AMPLIFIED PROBE TECHNIQUE, MAKING USE OF HIGH THROUGHPUT TECHNOLOGIES AS DESCRIBED BY CMS-2020-01-R: HCPCS | Performed by: NURSE PRACTITIONER

## 2021-09-24 NOTE — TELEPHONE ENCOUNTER
2nd day calling  pt is asking for call back about her lab and XR results  Her chest is still hurting   Please advise, Thank you

## 2021-09-24 NOTE — TELEPHONE ENCOUNTER
Call patient spoke with her regarding chest x-ray results  Spoke to Dr Joy Mccoy at [facility]iology  States that patient has pneumonia possibly COVID  Patient should be tested for COVID  Patient advised  Discussed quarantine with patient  Will order test for today at 2:30 a m  Claudia Shirley Patient is trying to get coverage so she can make it  Works in a   Advised to maintain quarantine

## 2021-09-27 ENCOUNTER — TELEMEDICINE (OUTPATIENT)
Dept: FAMILY MEDICINE CLINIC | Facility: CLINIC | Age: 61
End: 2021-09-27
Payer: COMMERCIAL

## 2021-09-27 DIAGNOSIS — U07.1 COVID-19: Primary | ICD-10-CM

## 2021-09-27 PROBLEM — N30.00 ACUTE CYSTITIS WITHOUT HEMATURIA: Status: RESOLVED | Noted: 2020-12-10 | Resolved: 2021-09-27

## 2021-09-27 PROCEDURE — 99213 OFFICE O/P EST LOW 20 MIN: CPT | Performed by: NURSE PRACTITIONER

## 2021-09-27 NOTE — PROGRESS NOTES
COVID-19 Outpatient Progress Note    Assessment/Plan:    Problem List Items Addressed This Visit        Other    COVID-19 - Primary         Disposition:     I recommended self-quarantine for 10 days and to watch for symptoms until 14 days after exposure  If patient were to develop symptoms, they should self isolate and call our office for further guidance  I have spent 14 minutes directly with the patient  Greater than 50% of this time was spent in counseling/coordination of care regarding: diagnostic results, prognosis, risks and benefits of treatment options, instructions for management, patient and family education, importance of treatment compliance, risk factor reductions and impressions  Completed Z-Pio as prescribed  Symptoms have resolved  Patient is feeling much better  Verification of patient location:    Patient is located in the following state in which I hold an active license [place]    Encounter provider Yojana Roman, [address] [place] [address]    Provider located at [facility] [address] [place] [id number] [phone]    Recent Visits  Date Type Provider Dept   09/24/21 Telephone Donny Simon 177  [phone] [address] [place] [address]   09/23/21 Telephone Anna Calvin U  91    09/21/21 Telemedicine Zamzam Colby, [address] recent visits within past 7 days and meeting all other requirements  Today's Visits  Date Type Provider Dept   09/27/21 Telemedicine Yojana Roman, [address] [place] [address]   Showing today's visits and meeting all other requirements  Future Appointments  No visits were found meeting these conditions  Showing future appointments within next 150 days and meeting all other requirements     This virtual check-in was done via [employer] and patient was informed that this is a secure, HIPAA-compliant platform   She agrees to proceed  Patient agrees to participate in a virtual check in via telephone or video visit instead of presenting to the office to address urgent/immediate medical needs  Patient is aware this is a billable service  After connecting through [facility], the patient was identified by name and date of birth  Doyce Soulier was informed that this was a telemedicine visit and that the exam was being conducted confidentially over secure lines  My office door was closed  No one else was in the room  Doyce Soulier acknowledged consent and understanding of privacy and security of the telemedicine visit  I informed the patient that I have reviewed her record in Epic and presented the opportunity for her to ask any questions regarding the visit today  The patient agreed to participate  Subjective:   Doyce Soulier is a 61 y o  female who is concerned about COVID-19  Date of symptom onset: 9/13/2021  COVID-19 vaccination status: Not vaccinated    Exposure:   Contact with a person who is under investigation (PUI) for or who is positive for COVID-19 within the last 14 days?: No    Hospitalized recently for fever and/or lower respiratory symptoms?: No      Currently a healthcare worker that is involved in direct patient care?: No      Works in a special setting where the risk of COVID-19 transmission may be high? (this may include long-term care, correctional and half-way facilities; homeless shelters; assisted-living facilities and group homes ): No      Resident in a special setting where the risk of COVID-19 transmission may be high? (this may include long-term care, correctional and half-way facilities; homeless shelters; assisted-living facilities and group homes ): No        Ailyn Franco presents for a virtual visit  She developed symptoms on 09/13/2021 which consisted of fatigue, weakness, cough, sinus pressure, nausea, altered taste, cough, and diarrhea    She was tested for COVID on 09/24/2021 which was positive  She obtained a chest x-ray on the same day which showed mild bilateral ground-glass opacities  She was treated with azithromycin which she completed as prescribed  Today, she denies fever, chills, shortness of breath, or wheezing  She is feeling much better and her symptoms have resolved  Lab Results   Component Value Date    SARSCOV2 Positive (A) 09/24/2021     Past Medical History:   Diagnosis Date    Essential hypertension     Resolved:4/21/2017     Past Surgical History:   Procedure Laterality Date    DILATION AND CURETTAGE OF UTERUS      NO PAST SURGERIES      TUBAL LIGATION       Current Outpatient Medications   Medication Sig Dispense Refill    amLODIPine-benazepril (LOTREL) 5-40 MG per capsule Take 1 capsule by mouth daily 90 capsule 1    fluticasone (FLONASE) 50 mcg/act nasal spray 1 spray into each nostril daily 16 g 0    hydrochlorothiazide (HYDRODIURIL) 12 5 mg tablet TAKE 1 TABLET BY MOUTH EVERY MORNING 90 tablet 1    hydrocortisone 2 5 % cream Apply topically 2 (two) times a day 30 g 0     No current facility-administered medications for this visit  No Known Allergies    Review of Systems   Constitutional: Negative  HENT: Negative  Respiratory: Negative  Cardiovascular: Negative  Gastrointestinal: Negative  Musculoskeletal: Negative  Skin: Negative  Neurological: Negative  Psychiatric/Behavioral: Negative  Objective: There were no vitals filed for this visit  Physical Exam  Vitals reviewed  Constitutional:       General: She is not in acute distress  Appearance: Normal appearance  She is not ill-appearing, toxic-appearing or diaphoretic  HENT:      Head: Normocephalic and atraumatic  Eyes:      Conjunctiva/sclera: Conjunctivae normal    Pulmonary:      Effort: Pulmonary effort is normal    Musculoskeletal:      Cervical back: Neck supple  Neurological:      General: No focal deficit present        Mental Status: She is alert and oriented to person, place, and time  Psychiatric:         Mood and Affect: Mood normal          Behavior: Behavior normal          Thought Content: Thought content normal          Judgment: Judgment normal          VIRTUAL VISIT DISCLAIMER    Anthony Vale verbally agrees to participate in [employer]  Pt is aware that [employer] could be limited without vital signs or the ability to perform a full hands-on physical Yelena Shimon understands she or the provider may request at any time to terminate the video visit and request the patient to seek care or treatment in person

## 2021-09-28 ENCOUNTER — TELEPHONE (OUTPATIENT)
Dept: FAMILY MEDICINE CLINIC | Facility: CLINIC | Age: 61
End: 2021-09-28

## 2021-09-28 NOTE — TELEPHONE ENCOUNTER
Patient is requesting a call from you  She wants to know what else she should do, if she should take another Keny Netters? She is upset that no one has been calling her back  Can you please reach out to the patient?

## 2021-09-28 NOTE — TELEPHONE ENCOUNTER
Spoke with pt, completed Zithromax therapy  Reports  symptoms have improved/resolved  No other medication needed

## 2021-10-07 ENCOUNTER — TELEMEDICINE (OUTPATIENT)
Dept: FAMILY MEDICINE CLINIC | Facility: CLINIC | Age: 61
End: 2021-10-07
Payer: COMMERCIAL

## 2021-10-07 DIAGNOSIS — H93.8X3 CONGESTION OF BOTH EARS: Primary | ICD-10-CM

## 2021-10-07 DIAGNOSIS — U07.1 COVID-19: ICD-10-CM

## 2021-10-07 PROCEDURE — 99213 OFFICE O/P EST LOW 20 MIN: CPT | Performed by: NURSE PRACTITIONER

## 2021-10-07 RX ORDER — PREDNISONE 20 MG/1
20 TABLET ORAL DAILY
Qty: 5 TABLET | Refills: 0 | Status: SHIPPED | OUTPATIENT
Start: 2021-10-07

## 2021-10-28 DIAGNOSIS — I10 ESSENTIAL HYPERTENSION: ICD-10-CM

## 2021-10-28 RX ORDER — AMLODIPINE BESYLATE AND BENAZEPRIL HYDROCHLORIDE 5; 40 MG/1; MG/1
CAPSULE ORAL
Qty: 90 CAPSULE | Refills: 1 | Status: SHIPPED | OUTPATIENT
Start: 2021-10-28 | End: 2022-07-10

## 2021-11-10 ENCOUNTER — TELEMEDICINE (OUTPATIENT)
Dept: FAMILY MEDICINE CLINIC | Facility: CLINIC | Age: 61
End: 2021-11-10
Payer: COMMERCIAL

## 2021-11-10 DIAGNOSIS — J32.9 FREQUENT SINUS INFECTIONS: Primary | ICD-10-CM

## 2021-11-10 PROCEDURE — 99213 OFFICE O/P EST LOW 20 MIN: CPT | Performed by: NURSE PRACTITIONER

## 2022-01-21 DIAGNOSIS — R60.9 EDEMA, UNSPECIFIED TYPE: ICD-10-CM

## 2022-01-21 RX ORDER — HYDROCHLOROTHIAZIDE 12.5 MG/1
TABLET ORAL
Qty: 90 TABLET | Refills: 1 | Status: SHIPPED | OUTPATIENT
Start: 2022-01-21 | End: 2022-08-03

## 2022-06-24 ENCOUNTER — OFFICE VISIT (OUTPATIENT)
Dept: FAMILY MEDICINE CLINIC | Facility: CLINIC | Age: 62
End: 2022-06-24
Payer: COMMERCIAL

## 2022-06-24 VITALS
SYSTOLIC BLOOD PRESSURE: 136 MMHG | WEIGHT: 247.4 LBS | HEART RATE: 97 BPM | DIASTOLIC BLOOD PRESSURE: 78 MMHG | BODY MASS INDEX: 42.24 KG/M2 | HEIGHT: 64 IN | OXYGEN SATURATION: 97 % | TEMPERATURE: 98.2 F

## 2022-06-24 DIAGNOSIS — G89.29 CHRONIC PAIN OF RIGHT ANKLE: Primary | ICD-10-CM

## 2022-06-24 DIAGNOSIS — M77.51 TENDONITIS OF ANKLE, RIGHT: ICD-10-CM

## 2022-06-24 DIAGNOSIS — I10 ESSENTIAL HYPERTENSION: ICD-10-CM

## 2022-06-24 DIAGNOSIS — R10.9 FLANK PAIN: ICD-10-CM

## 2022-06-24 DIAGNOSIS — Z00.00 HEALTHCARE MAINTENANCE: ICD-10-CM

## 2022-06-24 DIAGNOSIS — E66.01 CLASS 3 SEVERE OBESITY DUE TO EXCESS CALORIES WITHOUT SERIOUS COMORBIDITY WITH BODY MASS INDEX (BMI) OF 40.0 TO 44.9 IN ADULT (HCC): ICD-10-CM

## 2022-06-24 DIAGNOSIS — M25.571 CHRONIC PAIN OF RIGHT ANKLE: Primary | ICD-10-CM

## 2022-06-24 PROBLEM — E66.813 CLASS 3 SEVERE OBESITY DUE TO EXCESS CALORIES WITHOUT SERIOUS COMORBIDITY WITH BODY MASS INDEX (BMI) OF 40.0 TO 44.9 IN ADULT (HCC): Status: ACTIVE | Noted: 2022-06-24

## 2022-06-24 PROBLEM — E66.813 CLASS 3 SEVERE OBESITY IN ADULT (HCC): Status: ACTIVE | Noted: 2020-03-06

## 2022-06-24 PROCEDURE — 1036F TOBACCO NON-USER: CPT | Performed by: NURSE PRACTITIONER

## 2022-06-24 PROCEDURE — 3008F BODY MASS INDEX DOCD: CPT | Performed by: NURSE PRACTITIONER

## 2022-06-24 PROCEDURE — 3725F SCREEN DEPRESSION PERFORMED: CPT | Performed by: NURSE PRACTITIONER

## 2022-06-24 PROCEDURE — 99214 OFFICE O/P EST MOD 30 MIN: CPT | Performed by: NURSE PRACTITIONER

## 2022-06-24 RX ORDER — COVID-19 ANTIGEN TEST
KIT MISCELLANEOUS
COMMUNITY
Start: 2022-06-20

## 2022-06-24 RX ORDER — TRAMADOL HYDROCHLORIDE 50 MG/1
50 TABLET ORAL EVERY 6 HOURS PRN
Qty: 20 TABLET | Refills: 0 | Status: SHIPPED | OUTPATIENT
Start: 2022-06-24

## 2022-06-24 RX ORDER — PREDNISONE 20 MG/1
20 TABLET ORAL DAILY
Qty: 5 TABLET | Refills: 0 | Status: SHIPPED | OUTPATIENT
Start: 2022-06-24

## 2022-06-24 NOTE — ASSESSMENT & PLAN NOTE
Right ankle pain and swelling had an injury a few months ago  X-ray ordered  Advised to wear ankle brace  Prednisone therapy to 3 tendinitis    Elevate to help with fluid

## 2022-06-24 NOTE — ASSESSMENT & PLAN NOTE
Patient has elevated BMI  Is hypertensive  Discussed benefits of weight loss  Reports using phentermine in the past but at some point it did not work  Patient does not like to take injections  Discussed Rybelsus, phentermine, Wellbutrin

## 2022-06-24 NOTE — PATIENT INSTRUCTIONS
Obtain fasting labs, nothing to eat after midnight, may drink water     Get ankle brace  Prednisone daily for the next 5 days   carbohydrates  Increase water  Tramadol for acute episodes of pain you may take Tylenol during the day   Elevate legs as much as possible get enough sleep

## 2022-06-24 NOTE — PROGRESS NOTES
Assessment/Plan:  BMI Counseling: Body mass index is 42 47 kg/m²  The BMI is above normal  Nutrition recommendations include decreasing portion sizes, encouraging healthy choices of fruits and vegetables, decreasing fast food intake, consuming healthier snacks, limiting drinks that contain sugar, moderation in carbohydrate intake, increasing intake of lean protein, reducing intake of saturated and trans fat and reducing intake of cholesterol  Exercise recommendations include exercising 3-5 times per week and strength training exercises  No pharmacotherapy was ordered  Rationale for BMI follow-up plan is due to patient being overweight or obese  Depression Screening and Follow-up Plan: Patient was screened for depression during today's encounter  They screened negative with a PHQ-2 score of 0  Chronic pain of right ankle  Right ankle pain and swelling had an injury a few months ago  X-ray ordered  Advised to wear ankle brace  Prednisone therapy to 3 tendinitis  Elevate to help with fluid    Flank pain  Patient has right flank pain  Discussed musculoskeletal versus kidney pain  Discussed getting some good sleep support  May use heat and muscle rub at site get blood work done  Get urine done    Class 3 severe obesity due to excess calories without serious comorbidity with body mass index (BMI) of 40 0 to 44 9 in adult [facility])  Patient has elevated BMI  Is hypertensive  Discussed benefits of weight loss  Reports using phentermine in the past but at some point it did not work  Patient does not like to take injections  Discussed Rybelsus, phentermine, Wellbutrin  Problem List Items Addressed This Visit        Cardiovascular and Mediastinum    Essential hypertension    Relevant Orders    Microalbumin / creatinine urine ratio       Other    Chronic pain of right ankle - Primary     Right ankle pain and swelling had an injury a few months ago  X-ray ordered  Advised to wear ankle brace  Prednisone therapy to 3 tendinitis  Elevate to help with fluid           Relevant Orders    Ankle Cude ankle/Ankle Brace    XR foot 3+ vw right    Flank pain     Patient has right flank pain  Discussed musculoskeletal versus kidney pain  Discussed getting some good sleep support  May use heat and muscle rub at site get blood work done  Get urine done           Relevant Orders    UA w Reflex to Microscopic w Reflex to Culture -Lab Collect    Class 3 severe obesity due to excess calories without serious comorbidity with body mass index (BMI) of 40 0 to 44 9 in adult [facility])     Patient has elevated BMI  Is hypertensive  Discussed benefits of weight loss  Reports using phentermine in the past but at some point it did not work  Patient does not like to take injections  Discussed Rybelsus, phentermine, Wellbutrin  Other Visit Diagnoses     Tendonitis of ankle, right        Relevant Medications    predniSONE 20 mg tablet    traMADol (Ultram) 50 mg tablet    Healthcare maintenance        Relevant Orders    CBC and differential    Comprehensive metabolic panel    Lipid panel    TSH, 3rd generation with Free T4 reflex            Subjective:      Patient ID: Flor Reeves is a 64 y o  female  Patient is here with complaints of bilateral leg swelling also has acute tenderness in the left ankle, foot  States that she had an injury more than 6 months ago and did also tripped recently  Is on her feet constantly she has a  she is very busy  Patient reports she has right flank pain  Sleeps on her couch does not sleep in her bed  Ongoing for more than 6 months  As unable to give a urine specimen today      The following portions of the patient's history were reviewed and updated as appropriate: allergies, current medications, past family history, past medical history, past social history, past surgical history and problem list     Review of Systems   Constitutional: Negative  HENT: Negative  Eyes: Negative  Respiratory: Negative  Cardiovascular: Negative  Gastrointestinal: Positive for abdominal pain  Endocrine: Negative  Genitourinary: Negative  Musculoskeletal: Positive for arthralgias and joint swelling  Allergic/Immunologic: Negative  Neurological: Negative  Psychiatric/Behavioral: Negative  Objective:      /78   Pulse 97   Temp 98 2 °F (36 8 °C)   Ht 5' 4" (1 626 m)   Wt 112 kg (247 lb 6 4 oz)   SpO2 97%   BMI 42 47 kg/m²          Physical Exam  Vitals and nursing note reviewed  Constitutional:       Appearance: She is well-developed  She is obese  HENT:      Head: Normocephalic and atraumatic  Eyes:      Extraocular Movements: Extraocular movements intact  Pupils: Pupils are equal, round, and reactive to light  Cardiovascular:      Rate and Rhythm: Normal rate and regular rhythm  Pulses: Normal pulses  Heart sounds: Normal heart sounds  Pulmonary:      Effort: Pulmonary effort is normal       Breath sounds: Normal breath sounds  Abdominal:      General: Abdomen is flat  Bowel sounds are normal       Palpations: Abdomen is soft  Musculoskeletal:         General: Normal range of motion  Cervical back: Normal range of motion  Right lower leg: Edema present  Left lower leg: Edema (+3) present  Skin:     General: Skin is warm and dry  Neurological:      Mental Status: She is alert and oriented to person, place, and time  Psychiatric:         Mood and Affect: Mood normal          Behavior: Behavior normal          Thought Content:  Thought content normal          Judgment: Judgment normal

## 2022-06-24 NOTE — ASSESSMENT & PLAN NOTE
Patient has right flank pain  Discussed musculoskeletal versus kidney pain  Discussed getting some good sleep support  May use heat and muscle rub at site get blood work done   Get urine done

## 2022-06-25 ENCOUNTER — APPOINTMENT (OUTPATIENT)
Dept: LAB | Facility: HOSPITAL | Age: 62
End: 2022-06-25
Payer: COMMERCIAL

## 2022-06-25 ENCOUNTER — HOSPITAL ENCOUNTER (OUTPATIENT)
Dept: RADIOLOGY | Facility: HOSPITAL | Age: 62
Discharge: HOME | End: 2022-06-25
Payer: COMMERCIAL

## 2022-06-25 DIAGNOSIS — Z00.00 HEALTHCARE MAINTENANCE: ICD-10-CM

## 2022-06-25 DIAGNOSIS — G89.29 CHRONIC PAIN OF RIGHT ANKLE: ICD-10-CM

## 2022-06-25 DIAGNOSIS — R10.9 FLANK PAIN: ICD-10-CM

## 2022-06-25 DIAGNOSIS — M25.571 CHRONIC PAIN OF RIGHT ANKLE: ICD-10-CM

## 2022-06-25 DIAGNOSIS — M79.89 LEG SWELLING: ICD-10-CM

## 2022-06-25 LAB
ALBUMIN SERPL BCP-MCNC: 3.4 G/DL (ref 3.5–5)
ALP SERPL-CCNC: 72 U/L (ref 46–116)
ALT SERPL W P-5'-P-CCNC: 23 U/L (ref 12–78)
ANION GAP SERPL CALCULATED.3IONS-SCNC: 8 MMOL/L (ref 4–13)
AST SERPL W P-5'-P-CCNC: 16 U/L (ref 5–45)
BASOPHILS # BLD AUTO: 0.03 THOUSANDS/ΜL (ref 0–0.1)
BASOPHILS NFR BLD AUTO: 1 % (ref 0–1)
BILIRUB SERPL-MCNC: 0.36 MG/DL (ref 0.2–1)
BILIRUB UR QL STRIP: NEGATIVE
BUN SERPL-MCNC: 14 MG/DL (ref 5–25)
CALCIUM ALBUM COR SERPL-MCNC: 9.6 MG/DL (ref 8.3–10.1)
CALCIUM SERPL-MCNC: 9.1 MG/DL (ref 8.3–10.1)
CHLORIDE SERPL-SCNC: 106 MMOL/L (ref 100–108)
CHOLEST SERPL-MCNC: 214 MG/DL
CLARITY UR: NORMAL
CO2 SERPL-SCNC: 30 MMOL/L (ref 21–32)
COLOR UR: YELLOW
CREAT SERPL-MCNC: 0.75 MG/DL (ref 0.6–1.3)
EOSINOPHIL # BLD AUTO: 0.08 THOUSAND/ΜL (ref 0–0.61)
EOSINOPHIL NFR BLD AUTO: 2 % (ref 0–6)
ERYTHROCYTE [DISTWIDTH] IN BLOOD BY AUTOMATED COUNT: 13.1 % (ref 11.6–15.1)
GFR SERPL CREATININE-BSD FRML MDRD: 86 ML/MIN/1.73SQ M
GLUCOSE P FAST SERPL-MCNC: 103 MG/DL (ref 65–99)
GLUCOSE UR STRIP-MCNC: NEGATIVE MG/DL
HCT VFR BLD AUTO: 40.5 % (ref 34.8–46.1)
HDLC SERPL-MCNC: 58 MG/DL
HGB BLD-MCNC: 13 G/DL (ref 11.5–15.4)
HGB UR QL STRIP.AUTO: NEGATIVE
IMM GRANULOCYTES # BLD AUTO: 0.01 THOUSAND/UL (ref 0–0.2)
IMM GRANULOCYTES NFR BLD AUTO: 0 % (ref 0–2)
KETONES UR STRIP-MCNC: NEGATIVE MG/DL
LDLC SERPL CALC-MCNC: 141 MG/DL (ref 0–100)
LEUKOCYTE ESTERASE UR QL STRIP: NEGATIVE
LYMPHOCYTES # BLD AUTO: 1.99 THOUSANDS/ΜL (ref 0.6–4.47)
LYMPHOCYTES NFR BLD AUTO: 38 % (ref 14–44)
MCH RBC QN AUTO: 29.7 PG (ref 26.8–34.3)
MCHC RBC AUTO-ENTMCNC: 32.1 G/DL (ref 31.4–37.4)
MCV RBC AUTO: 93 FL (ref 82–98)
MONOCYTES # BLD AUTO: 0.43 THOUSAND/ΜL (ref 0.17–1.22)
MONOCYTES NFR BLD AUTO: 8 % (ref 4–12)
NEUTROPHILS # BLD AUTO: 2.67 THOUSANDS/ΜL (ref 1.85–7.62)
NEUTS SEG NFR BLD AUTO: 51 % (ref 43–75)
NITRITE UR QL STRIP: NEGATIVE
NONHDLC SERPL-MCNC: 156 MG/DL
NRBC BLD AUTO-RTO: 0 /100 WBCS
PH UR STRIP.AUTO: 6 [PH]
PLATELET # BLD AUTO: 244 THOUSANDS/UL (ref 149–390)
PMV BLD AUTO: 10.2 FL (ref 8.9–12.7)
POTASSIUM SERPL-SCNC: 3.8 MMOL/L (ref 3.5–5.3)
PROT SERPL-MCNC: 7.5 G/DL (ref 6.4–8.2)
PROT UR STRIP-MCNC: NEGATIVE MG/DL
RBC # BLD AUTO: 4.37 MILLION/UL (ref 3.81–5.12)
SODIUM SERPL-SCNC: 144 MMOL/L (ref 136–145)
SP GR UR STRIP.AUTO: 1.02 (ref 1–1.03)
TRIGL SERPL-MCNC: 77 MG/DL
TSH SERPL DL<=0.05 MIU/L-ACNC: 1.54 UIU/ML (ref 0.45–4.5)
UROBILINOGEN UR QL STRIP.AUTO: 0.2 E.U./DL
WBC # BLD AUTO: 5.21 THOUSAND/UL (ref 4.31–10.16)

## 2022-06-25 PROCEDURE — 82570 ASSAY OF URINE CREATININE: CPT | Performed by: NURSE PRACTITIONER

## 2022-06-25 PROCEDURE — 73630 X-RAY EXAM OF FOOT: CPT

## 2022-06-25 PROCEDURE — 80053 COMPREHEN METABOLIC PANEL: CPT

## 2022-06-25 PROCEDURE — 80061 LIPID PANEL: CPT

## 2022-06-25 PROCEDURE — 85025 COMPLETE CBC W/AUTO DIFF WBC: CPT

## 2022-06-25 PROCEDURE — 84443 ASSAY THYROID STIM HORMONE: CPT

## 2022-06-25 PROCEDURE — 82043 UR ALBUMIN QUANTITATIVE: CPT | Performed by: NURSE PRACTITIONER

## 2022-06-25 PROCEDURE — 81003 URINALYSIS AUTO W/O SCOPE: CPT

## 2022-06-25 PROCEDURE — 36415 COLL VENOUS BLD VENIPUNCTURE: CPT

## 2022-06-26 LAB
CREAT UR-MCNC: 220 MG/DL
MICROALBUMIN UR-MCNC: 11.6 MG/L (ref 0–20)
MICROALBUMIN/CREAT 24H UR: 5 MG/G CREATININE (ref 0–30)

## 2022-06-27 ENCOUNTER — TELEPHONE (OUTPATIENT)
Dept: FAMILY MEDICINE CLINIC | Facility: CLINIC | Age: 62
End: 2022-06-27

## 2022-06-27 NOTE — TELEPHONE ENCOUNTER
Lab work and x-ray results are in   While patient is aware that results are in(mychart)   She would like a call back to go over results with you

## 2022-06-28 ENCOUNTER — TELEPHONE (OUTPATIENT)
Dept: FAMILY MEDICINE CLINIC | Facility: CLINIC | Age: 62
End: 2022-06-28

## 2022-06-28 NOTE — TELEPHONE ENCOUNTER
----- Message from Tigre Valentin sent at 6/28/2022  7:35 AM EDT -----  Please let patient know that her glucose and cholesterol is elevated  Maintain low-carbohydrate heart healthy diet    Will discuss at next office visit

## 2022-06-28 NOTE — TELEPHONE ENCOUNTER
Called patient to review labs but no answer  Left message asking her to give office a call  Awaiting response

## 2022-06-30 ENCOUNTER — TELEPHONE (OUTPATIENT)
Dept: FAMILY MEDICINE CLINIC | Facility: CLINIC | Age: 62
End: 2022-06-30

## 2022-06-30 NOTE — TELEPHONE ENCOUNTER
marcelo has called back, she is aware about her lab results  she is asking for call back about her XR results  She do not wish to wait until her appt on 7/15  Please advise, Thank you       Dunia Crystal is out today, can you please look over her XR results   Thank you

## 2022-07-01 DIAGNOSIS — M77.31 HEEL SPUR, RIGHT: ICD-10-CM

## 2022-07-01 DIAGNOSIS — M79.671 RIGHT FOOT PAIN: Primary | ICD-10-CM

## 2022-07-01 DIAGNOSIS — M19.90 ARTHRITIS: ICD-10-CM

## 2022-07-10 DIAGNOSIS — I10 ESSENTIAL HYPERTENSION: ICD-10-CM

## 2022-07-10 RX ORDER — AMLODIPINE BESYLATE AND BENAZEPRIL HYDROCHLORIDE 5; 40 MG/1; MG/1
CAPSULE ORAL
Qty: 90 CAPSULE | Refills: 1 | Status: SHIPPED | OUTPATIENT
Start: 2022-07-10

## 2022-07-11 ENCOUNTER — TELEMEDICINE (OUTPATIENT)
Dept: FAMILY MEDICINE CLINIC | Facility: CLINIC | Age: 62
End: 2022-07-11
Payer: COMMERCIAL

## 2022-07-11 ENCOUNTER — RA CDI HCC (OUTPATIENT)
Dept: OTHER | Facility: HOSPITAL | Age: 62
End: 2022-07-11

## 2022-07-11 DIAGNOSIS — J01.10 ACUTE NON-RECURRENT FRONTAL SINUSITIS: ICD-10-CM

## 2022-07-11 DIAGNOSIS — J01.00 ACUTE NON-RECURRENT MAXILLARY SINUSITIS: Primary | ICD-10-CM

## 2022-07-11 PROCEDURE — 99213 OFFICE O/P EST LOW 20 MIN: CPT

## 2022-07-11 RX ORDER — AZITHROMYCIN 250 MG/1
TABLET, FILM COATED ORAL
Qty: 6 TABLET | Refills: 0 | Status: SHIPPED | OUTPATIENT
Start: 2022-07-11 | End: 2022-07-16

## 2022-07-11 NOTE — PROGRESS NOTES
Virtual Regular Visit    Verification of patient location:    Patient is located in the following state in which I hold an active license [place]      Assessment/Plan:    Problem List Items Addressed This Visit    None     Visit Diagnoses     Acute non-recurrent maxillary sinusitis    -  Primary    Has been treated for same symptoms in the past successfully with Z allan  Start Z allan and call if symptoms are not improved  Relevant Medications    azithromycin (Zithromax) 250 mg tablet    Acute non-recurrent frontal sinusitis        Has been treated for same symptoms in the past successfully with Z allan  Start Z allan and call if symptoms are not improved  Relevant Medications    azithromycin (Zithromax) 250 mg tablet               Reason for visit is   Chief Complaint   Patient presents with    Virtual Regular Visit        Encounter provider Lobo Kang    Provider located at [facility] [address] [id number] [phone]      Recent Visits  No visits were found meeting these conditions  Showing recent visits within past 7 days and meeting all other requirements  Today's Visits  Date Type Provider Dept   07/11/22 Telemedicine CLEMENTINE Kang [address] today's visits and meeting all other requirements  Future Appointments  No visits were found meeting these conditions  Showing future appointments within next 150 days and meeting all other requirements       The patient was identified by name and date of birth  Katy Godinez was informed that this is a telemedicine visit and that the visit is being conducted through [address] [place] [address] Now and patient was informed that this is a secure, HIPAA-compliant platform  She agrees to proceed     My office door was closed  No one else was in the room  She acknowledged consent and understanding of privacy and security of the video platform   The patient has agreed to participate and understands they can discontinue the visit at any time  Patient is aware this is a billable service  Subjective  Ang Guzman is a 64 y o  female For a few days she has been having pressure in eyes and nose  Mucous and sinus PE is helping but didn't want to take it today  Isaiah Beckford has had congestion for 3 days  Past Medical History:   Diagnosis Date    Essential hypertension     Resolved:4/21/2017       Past Surgical History:   Procedure Laterality Date    DILATION AND CURETTAGE OF UTERUS      NO PAST SURGERIES      TUBAL LIGATION         Current Outpatient Medications   Medication Sig Dispense Refill    azithromycin (Zithromax) 250 mg tablet Take 2 tablets (500 mg total) by mouth daily for 1 day, THEN 1 tablet (250 mg total) daily for 4 days  6 tablet 0    amLODIPine-benazepril (LOTREL) 5-40 MG per capsule TAKE 1 CAPSULE BY MOUTH EVERY DAY 90 capsule 1    Flowflex COVID-19 [employer] Home Test KIT Use as directed      fluticasone (FLONASE) 50 mcg/act nasal spray 1 spray into each nostril daily 16 g 0    hydrochlorothiazide (HYDRODIURIL) 12 5 mg tablet TAKE 1 TABLET BY MOUTH EVERY DAY IN THE MORNING 90 tablet 1    hydrocortisone 2 5 % cream Apply topically 2 (two) times a day 30 g 0    predniSONE 20 mg tablet Take 1 tablet (20 mg total) by mouth daily 5 tablet 0    predniSONE 20 mg tablet Take 1 tablet (20 mg total) by mouth daily 5 tablet 0    traMADol (Ultram) 50 mg tablet Take 1 tablet (50 mg total) by mouth every 6 (six) hours as needed for moderate pain 20 tablet 0     No current facility-administered medications for this visit  No Known Allergies    Review of Systems   Constitutional: Negative for chills, diaphoresis, fatigue and fever  HENT: Positive for congestion, rhinorrhea, sinus pressure and sinus pain  Negative for ear pain, sneezing and sore throat  Eyes: Negative for pain and visual disturbance     Respiratory: Negative for cough, shortness of breath and wheezing  Cardiovascular: Negative for chest pain and palpitations  Gastrointestinal: Negative for abdominal pain, diarrhea, nausea and vomiting  Genitourinary: Negative for dysuria and hematuria  Musculoskeletal: Negative for arthralgias, back pain and myalgias  Skin: Negative for color change and rash  Neurological: Positive for dizziness and light-headedness  Negative for seizures, syncope and headaches  All other systems reviewed and are negative  Video Exam    There were no vitals filed for this visit  Physical Exam  Constitutional:       Appearance: She is ill-appearing  HENT:      Head: Normocephalic  Nose: Nose normal    Eyes:      Extraocular Movements: Extraocular movements intact  Pulmonary:      Effort: Pulmonary effort is normal    Neurological:      General: No focal deficit present  Mental Status: She is alert  Psychiatric:         Mood and Affect: Mood normal           I spent 15 minutes directly with the patient during this visit    VIRTUAL VISIT DISCLAIMER      Hailey Cesar verbally agrees to participate in [employer]  Pt is aware that [employer] could be limited without vital signs or the ability to perform a full hands-on physical Neiltali Delarosa understands she or the provider may request at any time to terminate the video visit and request the patient to seek care or treatment in person

## 2022-07-11 NOTE — PROGRESS NOTES
[facility] 75  coding opportunities       Chart reviewed, no opportunity found: CHART REVIEWED, NO OPPORTUNITY FOUND        Patients Insurance        Commercial Insurance: [employer]

## 2022-08-03 DIAGNOSIS — R60.9 EDEMA, UNSPECIFIED TYPE: ICD-10-CM

## 2022-08-03 RX ORDER — HYDROCHLOROTHIAZIDE 12.5 MG/1
TABLET ORAL
Qty: 90 TABLET | Refills: 1 | Status: SHIPPED | OUTPATIENT
Start: 2022-08-03

## 2022-10-12 PROBLEM — R05.8 PRODUCTIVE COUGH: Status: RESOLVED | Noted: 2021-09-22 | Resolved: 2022-10-12

## 2022-10-12 PROBLEM — J32.9 FREQUENT SINUS INFECTIONS: Status: RESOLVED | Noted: 2021-11-10 | Resolved: 2022-10-12

## 2022-11-07 DIAGNOSIS — I10 ESSENTIAL HYPERTENSION: ICD-10-CM

## 2022-11-07 RX ORDER — AMLODIPINE BESYLATE AND BENAZEPRIL HYDROCHLORIDE 5; 40 MG/1; MG/1
CAPSULE ORAL
Qty: 90 CAPSULE | Refills: 1 | Status: SHIPPED | OUTPATIENT
Start: 2022-11-07

## 2023-02-01 DIAGNOSIS — R60.9 EDEMA, UNSPECIFIED TYPE: ICD-10-CM

## 2023-02-01 RX ORDER — HYDROCHLOROTHIAZIDE 12.5 MG/1
TABLET ORAL
Qty: 90 TABLET | Refills: 1 | Status: SHIPPED | OUTPATIENT
Start: 2023-02-01

## 2023-07-06 ENCOUNTER — TELEMEDICINE (OUTPATIENT)
Dept: FAMILY MEDICINE CLINIC | Facility: CLINIC | Age: 63
End: 2023-07-06
Payer: COMMERCIAL

## 2023-07-06 DIAGNOSIS — J01.00 ACUTE NON-RECURRENT MAXILLARY SINUSITIS: ICD-10-CM

## 2023-07-06 DIAGNOSIS — Z12.31 SCREENING MAMMOGRAM, ENCOUNTER FOR: ICD-10-CM

## 2023-07-06 DIAGNOSIS — Z00.00 HEALTHCARE MAINTENANCE: ICD-10-CM

## 2023-07-06 DIAGNOSIS — Z11.59 ENCOUNTER FOR HEPATITIS C SCREENING TEST FOR LOW RISK PATIENT: ICD-10-CM

## 2023-07-06 DIAGNOSIS — E66.01 CLASS 3 SEVERE OBESITY DUE TO EXCESS CALORIES WITHOUT SERIOUS COMORBIDITY WITH BODY MASS INDEX (BMI) OF 40.0 TO 44.9 IN ADULT (HCC): ICD-10-CM

## 2023-07-06 DIAGNOSIS — Z12.11 SCREEN FOR COLON CANCER: Primary | ICD-10-CM

## 2023-07-06 DIAGNOSIS — Z11.4 SCREENING FOR HIV (HUMAN IMMUNODEFICIENCY VIRUS): ICD-10-CM

## 2023-07-06 PROCEDURE — 99214 OFFICE O/P EST MOD 30 MIN: CPT

## 2023-07-06 RX ORDER — AZITHROMYCIN 250 MG/1
TABLET, FILM COATED ORAL
Qty: 6 TABLET | Refills: 0 | Status: SHIPPED | OUTPATIENT
Start: 2023-07-06 | End: 2023-07-11

## 2023-07-06 NOTE — PROGRESS NOTES
Virtual Regular Visit    Verification of patient location:    Patient is located at Home in the following state in which I hold an active license [place]      Assessment/Plan:    Problem List Items Addressed This Visit        Other    Class 3 severe obesity due to excess calories without serious comorbidity with body mass index (BMI) of 40.0 to 44.9 in adult [facility])     Have all lab work, follow up for annual visit. Relevant Orders    CBC and differential    Comprehensive metabolic panel    Hemoglobin A1C    Lipid panel    TSH, 3rd generation with Free T4 reflex    Vitamin D 25 hydroxy   Other Visit Diagnoses     Screen for colon cancer    -  Primary    Have colonoscopy    Relevant Orders    Ambulatory Referral to Gastroenterology    Screening mammogram, encounter for        Have mammogram    Relevant Orders    Mammo screening bilateral w 3d & cad    Acute non-recurrent maxillary sinusitis        Will treat with zpak, follow up if symptoms are not improving by monday. Relevant Medications    azithromycin (Zithromax) 250 mg tablet    Healthcare maintenance        Please establish with gyn    Relevant Orders    Ambulatory Referral to Obstetrics / Gynecology    Encounter for hepatitis C screening test for low risk patient        have screening follow up for annual     Relevant Orders    Hepatitis C antibody    Screening for HIV (human immunodeficiency virus)        have screening, follow up for annual     Relevant Orders    : HIV 1/2 AB/AG w Reflex [facility] for 2 yr old and above               Reason for visit is   Chief Complaint   Patient presents with   • Virtual Regular Visit        Encounter provider CLEMENTINE Cuadra    Provider located at [facility] [address]  [address]      Recent Visits  No visits were found meeting these conditions.   Showing recent visits within past 7 days and meeting all other requirements  Today's Visits  Date Type Provider Dept   07/06/23 Telemedicine CLEMENTINE Marie Pg  [address] [facility],[address] today's visits and meeting all other requirements  Future Appointments  No visits were found meeting these conditions. Showing future appointments within next 150 days and meeting all other requirements       The patient was identified by name and date of birth. Rhiannon Beckford was informed that this is a telemedicine visit and that the visit is being conducted through the [address] [place] [address] 22 Now platform. She agrees to proceed. .  My office door was closed. No one else was in the room. She acknowledged consent and understanding of privacy and security of the video platform. The patient has agreed to participate and understands they can discontinue the visit at any time. Patient is aware this is a billable service. Subjective  Rhiannon Beckford is a 58 y.o. female she has had sinus pain and pressure for the past 5 days . David Pineda has been having sinus pressure and HA for 5 days. Past Medical History:   Diagnosis Date   • Essential hypertension     Resolved:4/21/2017       Past Surgical History:   Procedure Laterality Date   • DILATION AND CURETTAGE OF UTERUS     • NO PAST SURGERIES     • TUBAL LIGATION         Current Outpatient Medications   Medication Sig Dispense Refill   • azithromycin (Zithromax) 250 mg tablet Take 2 tablets (500 mg total) by mouth daily for 1 day, THEN 1 tablet (250 mg total) daily for 4 days.  6 tablet 0   • amLODIPine-benazepril (LOTREL) 5-40 MG per capsule TAKE 1 CAPSULE BY MOUTH EVERY DAY 90 capsule 1   • Flowflex COVID-19 Ag Home Test KIT Use as directed     • fluticasone (FLONASE) 50 mcg/act nasal spray 1 spray into each nostril daily 16 g 0   • hydrochlorothiazide (HYDRODIURIL) 12.5 mg tablet TAKE 1 TABLET BY MOUTH EVERY DAY IN THE MORNING 90 tablet 1   • hydrocortisone 2.5 % cream Apply topically 2 (two) times a day 30 g 0   • predniSONE 20 mg tablet Take 1 tablet (20 mg total) by mouth daily 5 tablet 0   • predniSONE 20 mg tablet Take 1 tablet (20 mg total) by mouth daily 5 tablet 0   • traMADol (Ultram) 50 mg tablet Take 1 tablet (50 mg total) by mouth every 6 (six) hours as needed for moderate pain 20 tablet 0     No current facility-administered medications for this visit. No Known Allergies    Review of Systems   Constitutional: Positive for diaphoresis. Negative for chills, fatigue and fever. HENT: Positive for congestion, ear pain, rhinorrhea, sinus pressure and sinus pain. Negative for postnasal drip and sore throat. Eyes: Negative for pain and visual disturbance. Respiratory: Negative for cough, chest tightness, shortness of breath and wheezing. Cardiovascular: Negative for chest pain and palpitations. Gastrointestinal: Negative for abdominal pain, constipation, diarrhea, nausea and vomiting. Genitourinary: Negative for dysuria, frequency, hematuria and urgency. Musculoskeletal: Negative for arthralgias, back pain and myalgias. Skin: Negative for color change and rash. Neurological: Positive for headaches. Negative for seizures and syncope. All other systems reviewed and are negative. Video Exam    There were no vitals filed for this visit. Physical Exam  Vitals reviewed: multiple attempts at virtural, unable to connect camera, converted to phone. Neurological:      General: No focal deficit present. Mental Status: She is alert.    Psychiatric:         Mood and Affect: Mood normal.         Behavior: Behavior normal.          Visit Time  Total Visit Duration: 15

## 2023-07-28 DIAGNOSIS — I10 ESSENTIAL HYPERTENSION: ICD-10-CM

## 2023-07-29 RX ORDER — AMLODIPINE AND BENAZEPRIL HYDROCHLORIDE 5; 40 MG/1; MG/1
CAPSULE ORAL
Qty: 30 CAPSULE | Refills: 0 | Status: SHIPPED | OUTPATIENT
Start: 2023-07-29 | End: 2023-09-06 | Stop reason: SDUPTHER

## 2023-08-07 DIAGNOSIS — R60.9 EDEMA, UNSPECIFIED TYPE: ICD-10-CM

## 2023-08-07 RX ORDER — HYDROCHLOROTHIAZIDE 12.5 MG/1
TABLET ORAL
Qty: 30 TABLET | Refills: 0 | Status: SHIPPED | OUTPATIENT
Start: 2023-08-07 | End: 2023-09-06 | Stop reason: SDUPTHER

## 2023-09-06 DIAGNOSIS — R60.9 EDEMA, UNSPECIFIED TYPE: ICD-10-CM

## 2023-09-06 DIAGNOSIS — I10 ESSENTIAL HYPERTENSION: ICD-10-CM

## 2023-09-06 RX ORDER — HYDROCHLOROTHIAZIDE 12.5 MG/1
TABLET ORAL
Qty: 90 TABLET | Refills: 1 | Status: SHIPPED | OUTPATIENT
Start: 2023-09-06

## 2023-09-06 RX ORDER — AMLODIPINE AND BENAZEPRIL HYDROCHLORIDE 5; 40 MG/1; MG/1
CAPSULE ORAL
Qty: 90 CAPSULE | Refills: 1 | Status: SHIPPED | OUTPATIENT
Start: 2023-09-06

## 2023-12-01 ENCOUNTER — TELEPHONE (OUTPATIENT)
Dept: FAMILY MEDICINE CLINIC | Facility: CLINIC | Age: 63
End: 2023-12-01

## 2023-12-01 DIAGNOSIS — J06.9 UPPER RESPIRATORY TRACT INFECTION, UNSPECIFIED TYPE: Primary | ICD-10-CM

## 2023-12-01 RX ORDER — AZITHROMYCIN 250 MG/1
TABLET, FILM COATED ORAL
Qty: 6 TABLET | Refills: 0 | Status: SHIPPED | OUTPATIENT
Start: 2023-12-01 | End: 2023-12-06

## 2023-12-01 NOTE — TELEPHONE ENCOUNTER
Spoke with patient- she is very congested and not feeling well at all. She tested herself for COVID and it is negative. 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 She requested a virtual appointment, however, we have no availability and it would be very difficult for her to get to [facility]. Are we able to send a z-allan to her pharmacy or is there a place we can squeeze in a virtual?    Please advise so I may notify patient. Thank you!

## 2023-12-08 ENCOUNTER — HOSPITAL ENCOUNTER (EMERGENCY)
Facility: HOSPITAL | Age: 63
Discharge: HOME | End: 2023-12-08
Attending: EMERGENCY MEDICINE
Payer: COMMERCIAL

## 2023-12-08 VITALS
TEMPERATURE: 97.4 F | DIASTOLIC BLOOD PRESSURE: 84 MMHG | BODY MASS INDEX: 42.05 KG/M2 | WEIGHT: 245 LBS | OXYGEN SATURATION: 95 % | RESPIRATION RATE: 18 BRPM | SYSTOLIC BLOOD PRESSURE: 153 MMHG | HEART RATE: 101 BPM

## 2023-12-08 DIAGNOSIS — U07.1 COVID-19: Primary | ICD-10-CM

## 2023-12-08 LAB
ALBUMIN SERPL BCP-MCNC: 4.3 G/DL (ref 3.5–5)
ALP SERPL-CCNC: 62 U/L (ref 34–104)
ALT SERPL W P-5'-P-CCNC: 16 U/L (ref 7–52)
ANION GAP SERPL CALCULATED.3IONS-SCNC: 7 MMOL/L
AST SERPL W P-5'-P-CCNC: 16 U/L (ref 13–39)
BASOPHILS # BLD AUTO: 0.03 THOUSANDS/ÂΜL (ref 0–0.1)
BASOPHILS NFR BLD AUTO: 0 % (ref 0–1)
BILIRUB SERPL-MCNC: 0.28 MG/DL (ref 0.2–1)
BUN SERPL-MCNC: 14 MG/DL (ref 5–25)
CALCIUM SERPL-MCNC: 9.7 MG/DL (ref 8.4–10.2)
CARDIAC TROPONIN I PNL SERPL HS: <2 NG/L
CHLORIDE SERPL-SCNC: 103 MMOL/L (ref 96–108)
CO2 SERPL-SCNC: 29 MMOL/L (ref 21–32)
CREAT SERPL-MCNC: 0.79 MG/DL (ref 0.6–1.3)
EOSINOPHIL # BLD AUTO: 0.13 THOUSAND/ÂΜL (ref 0–0.61)
EOSINOPHIL NFR BLD AUTO: 2 % (ref 0–6)
ERYTHROCYTE [DISTWIDTH] IN BLOOD BY AUTOMATED COUNT: 13.1 % (ref 11.6–15.1)
FLUAV RNA RESP QL NAA+PROBE: NEGATIVE
FLUBV RNA RESP QL NAA+PROBE: NEGATIVE
GFR SERPL CREATININE-BSD FRML MDRD: 79 ML/MIN/1.73SQ M
GLUCOSE SERPL-MCNC: 113 MG/DL (ref 65–140)
HCT VFR BLD AUTO: 38.5 % (ref 34.8–46.1)
HGB BLD-MCNC: 13.1 G/DL (ref 11.5–15.4)
IMM GRANULOCYTES # BLD AUTO: 0.02 THOUSAND/UL (ref 0–0.2)
IMM GRANULOCYTES NFR BLD AUTO: 0 % (ref 0–2)
LYMPHOCYTES # BLD AUTO: 2.64 THOUSANDS/ÂΜL (ref 0.6–4.47)
LYMPHOCYTES NFR BLD AUTO: 39 % (ref 14–44)
MCH RBC QN AUTO: 31 PG (ref 26.8–34.3)
MCHC RBC AUTO-ENTMCNC: 34 G/DL (ref 31.4–37.4)
MCV RBC AUTO: 91 FL (ref 82–98)
MONOCYTES # BLD AUTO: 0.56 THOUSAND/ÂΜL (ref 0.17–1.22)
MONOCYTES NFR BLD AUTO: 8 % (ref 4–12)
NEUTROPHILS # BLD AUTO: 3.43 THOUSANDS/ÂΜL (ref 1.85–7.62)
NEUTS SEG NFR BLD AUTO: 51 % (ref 43–75)
NRBC BLD AUTO-RTO: 0 /100 WBCS
PLATELET # BLD AUTO: 326 THOUSANDS/UL (ref 149–390)
PMV BLD AUTO: 10.4 FL (ref 8.9–12.7)
POTASSIUM SERPL-SCNC: 3.3 MMOL/L (ref 3.5–5.3)
PROT SERPL-MCNC: 7.8 G/DL (ref 6.4–8.4)
RBC # BLD AUTO: 4.22 MILLION/UL (ref 3.81–5.12)
RSV RNA RESP QL NAA+PROBE: NEGATIVE
SARS-COV-2 RNA RESP QL NAA+PROBE: POSITIVE
SODIUM SERPL-SCNC: 139 MMOL/L (ref 135–147)
WBC # BLD AUTO: 6.81 THOUSAND/UL (ref 4.31–10.16)

## 2023-12-08 PROCEDURE — 99285 EMERGENCY DEPT VISIT HI MDM: CPT | Performed by: EMERGENCY MEDICINE

## 2023-12-08 PROCEDURE — 99283 EMERGENCY DEPT VISIT LOW MDM: CPT

## 2023-12-08 PROCEDURE — 84484 ASSAY OF TROPONIN QUANT: CPT | Performed by: EMERGENCY MEDICINE

## 2023-12-08 PROCEDURE — 36415 COLL VENOUS BLD VENIPUNCTURE: CPT | Performed by: EMERGENCY MEDICINE

## 2023-12-08 PROCEDURE — 0241U HB NFCT DS VIR RESP RNA 4 TRGT: CPT | Performed by: EMERGENCY MEDICINE

## 2023-12-08 PROCEDURE — 85025 COMPLETE CBC W/AUTO DIFF WBC: CPT | Performed by: EMERGENCY MEDICINE

## 2023-12-08 PROCEDURE — 93005 ELECTROCARDIOGRAM TRACING: CPT

## 2023-12-08 PROCEDURE — 80053 COMPREHEN METABOLIC PANEL: CPT | Performed by: EMERGENCY MEDICINE

## 2023-12-09 LAB
ATRIAL RATE: 85 BPM
P AXIS: 75 DEGREES
PR INTERVAL: 146 MS
QRS AXIS: 89 DEGREES
QRSD INTERVAL: 66 MS
QT INTERVAL: 390 MS
QTC INTERVAL: 464 MS
T WAVE AXIS: 67 DEGREES
VENTRICULAR RATE: 85 BPM

## 2023-12-09 NOTE — ED PROVIDER NOTES
History  Chief Complaint   Patient presents with    Flu Symptoms     Pt reporting nasal congestion and sinus pressure x1 week, has been on z-pack. Admits to episode of lightheadedness earlier today that has improved      HPI    Prior to Admission Medications   Prescriptions Last Dose Informant Patient Reported? Taking? Flowflex COVID-19 Ag Home Test KIT   Yes No   Sig: Use as directed   amLODIPine-benazepril (LOTREL) 5-40 MG per capsule   No No   Sig: TAKE 1 CAPSULE BY MOUTH EVERY DAY   fluticasone (FLONASE) 50 mcg/act nasal spray   No No   Si spray into each nostril daily   hydrochlorothiazide (HYDRODIURIL) 12.5 mg tablet   No No   Sig: TAKE 1 TABLET BY MOUTH EVERY DAY IN THE MORNING   hydrocortisone 2.5 % cream   No No   Sig: Apply topically 2 (two) times a day   predniSONE 20 mg tablet   No No   Sig: Take 1 tablet (20 mg total) by mouth daily   predniSONE 20 mg tablet   No No   Sig: Take 1 tablet (20 mg total) by mouth daily   traMADol (Ultram) 50 mg tablet   No No   Sig: Take 1 tablet (50 mg total) by mouth every 6 (six) hours as needed for moderate pain      Facility-Administered Medications: None       Past Medical History:   Diagnosis Date    Essential hypertension     Resolved:2017       Past Surgical History:   Procedure Laterality Date    DILATION AND CURETTAGE OF UTERUS      NO PAST SURGERIES      TUBAL LIGATION         Family History   Problem Relation Age of Onset    Cancer Father         malignant neoplasm    Diabetes Cousin     Asthma Other         acute    Hypertension Other     Cancer Other         malignant neoplasm    Cancer Other         malignant neoplasm    Thyroid disease Family      I have reviewed and agree with the history as documented.     E-Cigarette/Vaping    E-Cigarette Use Never User      E-Cigarette/Vaping Substances    Nicotine No     THC No     CBD No     Flavoring No     Other No     Unknown No      Social History     Tobacco Use    Smoking status: Former    Smokeless tobacco: Never   Vaping Use    Vaping Use: Never used   Substance Use Topics    Alcohol use: No    Drug use: No       Review of Systems    Physical Exam  Physical Exam    Vital Signs  ED Triage Vitals [12/08/23 1938]   Temperature Pulse Respirations Blood Pressure SpO2   (!) 97.4 °F (36.3 °C) 101 18 153/84 95 %      Temp Source Heart Rate Source Patient Position - Orthostatic VS BP Location FiO2 (%)   Temporal Monitor -- Right arm --      Pain Score       --           Vitals:    12/08/23 1938   BP: 153/84   Pulse: 101         Visual Acuity      ED Medications  Medications - No data to display    Diagnostic Studies  Results Reviewed       Procedure Component Value Units Date/Time    Comprehensive metabolic panel [[id number]]     Lab Status: No result Specimen: Blood     CBC and differential [[id number]]     Lab Status: No result Specimen: Blood     HS Troponin 0hr (reflex protocol) [[id number]]     Lab Status: No result Specimen: Blood     FLU/RSV/COVID - if FLU/RSV clinically relevant [[id number]] Collected: 12/08/23 2010    Lab Status: In process Specimen: Nares from Nose Updated: 12/08/23 2018                   No orders to display              Procedures  ECG 12 Lead Documentation Only    Date/Time: 12/8/2023 8:59 PM    Performed by: Anand Wick DO  Authorized by: Anand Wick DO    Patient location:  ED  Previous ECG:     Previous ECG:  Unavailable  Interpretation:     Interpretation: non-specific    Rate:     ECG rate assessment: normal    Rhythm:     Rhythm: sinus rhythm    Ectopy:     Ectopy: none    QRS:     QRS axis:  Normal    QRS intervals:  Normal  Conduction:     Conduction: normal    ST segments:     ST segments:  Normal  T waves:     T waves: non-specific             ED Course                               SBIRT 22yo+      Flowsheet Row Most Recent Value   Initial Alcohol Screen: US AUDIT-C     1. How often do you have a drink containing alcohol? 0 Filed at: 12/08/2023 2012   2.  How many drinks containing alcohol do you have on a typical day you are drinking? 0 Filed at: 12/08/2023 2012   3b. FEMALE Any Age, or MALE 65+: How often do you have 4 or more drinks on one occassion? 0 Filed at: 12/08/2023 2012   Audit-C Score 0 Filed at: 12/08/2023 2012   UBALDO: How many times in the past year have you. .. Used an illegal drug or used a prescription medication for non-medical reasons? Never Filed at: 12/08/2023 2012                      Medical Decision Making  Amount and/or Complexity of Data Reviewed  Labs: ordered. Disposition  Final diagnoses:   None     ED Disposition       None          Follow-up Information    None         Patient's Medications   Discharge Prescriptions    No medications on file       No discharge procedures on file.     PDMP Review         Value Time User    PDMP Reviewed  Yes 6/24/2022  1:53 PM Nita Pena, [address] [place] [address]            ED Provider  Electronically Signed by Updated: 12/08/23 2058     SARS-CoV-2 Positive     INFLUENZA A PCR Negative     INFLUENZA B PCR Negative     RSV PCR Negative    Narrative:      FOR PEDIATRIC PATIENTS - copy/paste COVID Guidelines URL to browser: [email or web address]    SARS-CoV-2 assay is a Nucleic Acid Amplification assay intended for the  qualitative detection of nucleic acid from SARS-CoV-2 in nasopharyngeal  swabs. Results are for the presumptive identification of SARS-CoV-2 RNA. Positive results are indicative of infection with SARS-CoV-2, the virus  causing COVID-19, but do not rule out bacterial infection or co-infection  with other viruses. Laboratories within the [facility] and its  territories are required to report all positive results to the appropriate  public health authorities. Negative results do not preclude SARS-CoV-2  infection and should not be used as the sole basis for treatment or other  patient management decisions. Negative results must be combined with  clinical observations, patient history, and epidemiological information. This test has not been FDA cleared or approved. This test has been authorized by FDA under an Emergency Use Authorization  (EUA). This test is only authorized for the duration of time the  declaration that circumstances exist justifying the authorization of the  emergency use of an in vitro diagnostic tests for detection of SARS-CoV-2  virus and/or diagnosis of COVID-19 infection under section 564(b)(1) of  the Act, 21 U. S.C. 260EUA-8(K)(8), unless the authorization is terminated  or revoked sooner. The test has been validated but independent review by FDA  and CLIA is pending. Test performed using [employer] This RT-PCR assay targets N2,  a region unique to SARS-CoV-2. A conserved region in the E-gene was chosen  for pan-Sarbecovirus detection which includes SARS-CoV-2.     According to CMS-2020-01-R, this platform meets the definition of high-throughput technology. CBC and differential [[id number]] Collected: 12/08/23 2039    Lab Status: Final result Specimen: Blood from Arm, Right Updated: 12/08/23 2051     WBC 6.81 Thousand/uL      RBC 4.22 Million/uL      Hemoglobin 13.1 g/dL      Hematocrit 38.5 %      MCV 91 fL      MCH 31.0 pg      MCHC 34.0 g/dL      RDW 13.1 %      MPV 10.4 fL      Platelets 043 Thousands/uL      nRBC 0 /100 WBCs      Neutrophils Relative 51 %      Immat GRANS % 0 %      Lymphocytes Relative 39 %      Monocytes Relative 8 %      Eosinophils Relative 2 %      Basophils Relative 0 %      Neutrophils Absolute 3.43 Thousands/µL      Immature Grans Absolute 0.02 Thousand/uL      Lymphocytes Absolute 2.64 Thousands/µL      Monocytes Absolute 0.56 Thousand/µL      Eosinophils Absolute 0.13 Thousand/µL      Basophils Absolute 0.03 Thousands/µL                    No orders to display              Procedures  ECG 12 Lead Documentation Only    Date/Time: 12/8/2023 8:59 PM    Performed by: Srinivasa Harrell DO  Authorized by: Srinivasa Harrell DO    Patient location:  ED  Previous ECG:     Previous ECG:  Unavailable  Interpretation:     Interpretation: non-specific    Rate:     ECG rate assessment: normal    Rhythm:     Rhythm: sinus rhythm    Ectopy:     Ectopy: none    QRS:     QRS axis:  Normal    QRS intervals:  Normal  Conduction:     Conduction: normal    ST segments:     ST segments:  Normal  T waves:     T waves: non-specific             ED Course  ED Course as of 12/11/23 2349   Fri Dec 08, 2023   2149 Workup unremarkable, patient positive for COVID-19, have discussed return precautions and outpatient follow-up which patient states she understands. SBIRT 22yo+      Flowsheet Row Most Recent Value   Initial Alcohol Screen: US AUDIT-C     1. How often do you have a drink containing alcohol? 0 Filed at: 12/08/2023 2012   2.  How many drinks containing alcohol do you have on a typical day you are drinking? 0 Filed at: 12/08/2023 2012   3b. FEMALE Any Age, or MALE 65+: How often do you have 4 or more drinks on one occassion? 0 Filed at: 12/08/2023 2012   Audit-C Score 0 Filed at: 12/08/2023 2012   UBALDO: How many times in the past year have you. .. Used an illegal drug or used a prescription medication for non-medical reasons? Never Filed at: 12/08/2023 2012                      Medical Decision Making  77-year-old female past medical history of hypertension presenting with sinus pressure. Patient is well-appearing at bedside with stable vitals and in no acute distress. She has lungs clear to auscultation, no lower extreme edema, equal pulses and no other significant physical exam findings. As she does note lightheadedness with her symptoms will obtain cardiac workup to rule out ACS, electrolyte abnormalities, anemia, give symptomatic management for sinus infection versus viral syndrome, obtain viral testing and continue to monitor. Amount and/or Complexity of Data Reviewed  Labs: ordered. Disposition  Final diagnoses:   [id number]     Time reflects when diagnosis was documented in both MDM as applicable and the Disposition within this note       Time User Action Codes Description Comment    12/8/2023  9:49 PM Codi Oliva Add [U07.1] COVID-19           ED Disposition       ED Disposition   Discharge    Condition   Stable    Date/Time   Fri Dec 8, 2023 2149    Comment   Katy Martin discharge to home/self care. Follow-up Information       Follow up With Specialties Details Why Contact Info Additional Information    [facility] Schedule an appointment as soon as possible for a visit   [address] [place] [address] [address], [address], [address] [facility].   [address] [facility], [facility] [address], CRNP Nurse Practitioner In 1 week  [phone] [facility] [address]  [facility] [address] [facility] Emergency Department Emergency Medicine  If symptoms worsen 24[address] [facility] [address] [facility] Emergency Department, [place], [place], [address]            Discharge Medication List as of 12/8/2023  9:50 PM        CONTINUE these medications which have NOT CHANGED    Details   amLODIPine-benazepril (LOTREL) 5-40 MG per capsule TAKE 1 CAPSULE BY MOUTH EVERY DAY, Normal      Flowflex COVID-19 Ag Home Test KIT Use as directed, Starting Mon 6/20/2022, Historical Med      fluticasone (FLONASE) 50 mcg/act nasal spray 1 spray into each nostril daily, Starting Sat 2/15/2020, Print      hydrochlorothiazide (HYDRODIURIL) 12.5 mg tablet TAKE 1 TABLET BY MOUTH EVERY DAY IN THE MORNING, Normal      hydrocortisone 2.5 % cream Apply topically 2 (two) times a day, Starting Fri 5/8/2020, Normal      !! predniSONE 20 mg tablet Take 1 tablet (20 mg total) by mouth daily, Starting Thu 10/7/2021, Normal      !! predniSONE 20 mg tablet Take 1 tablet (20 mg total) by mouth daily, Starting Fri 6/24/2022, Normal      traMADol (Ultram) 50 mg tablet Take 1 tablet (50 mg total) by mouth every 6 (six) hours as needed for moderate pain, Starting Fri 6/24/2022, Normal       !! - Potential duplicate medications found. Please discuss with provider. No discharge procedures on file.     PDMP Review         Value Time User    PDMP Reviewed  Yes 6/24/2022  1:53 PM Kirit Wall, [facility]            ED Provider  Electronically Signed by             Jamir Soares DO  12/11/23 5005

## 2023-12-12 ENCOUNTER — TELEPHONE (OUTPATIENT)
Dept: FAMILY MEDICINE CLINIC | Facility: CLINIC | Age: 63
End: 2023-12-12

## 2023-12-12 NOTE — TELEPHONE ENCOUNTER
Bilateral ear infection, went to the Friday. She went for a sinus infection and they told her she had covid, but didn't give anything for her ears. She states she can't come in because she cares for her mother. Can she do a virtual?  I explained that she should come in to look in her ears, but she can't and she said that's why she went to the ER. Can you look at the ER note and see if she needs something?

## 2023-12-14 ENCOUNTER — OFFICE VISIT (OUTPATIENT)
Dept: FAMILY MEDICINE CLINIC | Facility: CLINIC | Age: 63
End: 2023-12-14
Payer: COMMERCIAL

## 2023-12-14 VITALS
HEIGHT: 64 IN | TEMPERATURE: 98.4 F | HEART RATE: 82 BPM | OXYGEN SATURATION: 99 % | DIASTOLIC BLOOD PRESSURE: 82 MMHG | WEIGHT: 244 LBS | SYSTOLIC BLOOD PRESSURE: 140 MMHG | BODY MASS INDEX: 41.66 KG/M2

## 2023-12-14 DIAGNOSIS — H66.90 ACUTE OTITIS MEDIA, UNSPECIFIED OTITIS MEDIA TYPE: Primary | ICD-10-CM

## 2023-12-14 PROCEDURE — 99214 OFFICE O/P EST MOD 30 MIN: CPT

## 2023-12-14 RX ORDER — LEVOCETIRIZINE DIHYDROCHLORIDE 5 MG/1
5 TABLET, FILM COATED ORAL EVERY EVENING
Qty: 30 TABLET | Refills: 1 | Status: SHIPPED | OUTPATIENT
Start: 2023-12-14

## 2023-12-14 RX ORDER — DOXYCYCLINE HYCLATE 100 MG/1
100 CAPSULE ORAL EVERY 12 HOURS SCHEDULED
Qty: 20 CAPSULE | Refills: 0 | Status: SHIPPED | OUTPATIENT
Start: 2023-12-14 | End: 2023-12-24

## 2023-12-14 NOTE — PROGRESS NOTES
Assessment/Plan:         Problem List Items Addressed This Visit    None  Visit Diagnoses     Acute otitis media, unspecified otitis media type    -  Primary    Will treat with doxy twice daily and xyzal, ok to use mucinex    Relevant Medications    doxycycline hyclate (VIBRAMYCIN) 100 mg capsule    levocetirizine (XYZAL) 5 MG tablet              Subjective:      Patient ID: Alem Clemons is a 61 y.o. female. Rj Mccurdy is here because she was sick and was treated with z allan and was having dizziness and went to ER, they told her she had covid, she did 2 home tests after the diagnosis and both were negative. Her ears are still bothering her. Earache   Pertinent negatives include no abdominal pain, coughing, diarrhea, headaches, rash, sore throat or vomiting. The following portions of the patient's history were reviewed and updated as appropriate:   Past Medical History:  She has a past medical history of Essential hypertension. ,  _______________________________________________________________________  Medical Problems:  does not have any pertinent problems on file.,  _______________________________________________________________________  Past Surgical History:   has a past surgical history that includes No past surgeries; Tubal ligation; and Dilation and curettage of uterus. ,  _______________________________________________________________________  Family History:  family history includes Asthma in her other; Cancer in her father, other, and other; Diabetes in her cousin; Hypertension in her other; Thyroid disease in her family. ,  _______________________________________________________________________  Social History:   reports that she has quit smoking. She has never used smokeless tobacco. She reports that she does not drink alcohol and does not use drugs. ,  _______________________________________________________________________  Allergies:  has No Known Allergies. .  _______________________________________________________________________  Current Outpatient Medications   Medication Sig Dispense Refill   • amLODIPine-benazepril (LOTREL) 5-40 MG per capsule TAKE 1 CAPSULE BY MOUTH EVERY DAY 90 capsule 1   • doxycycline hyclate (VIBRAMYCIN) 100 mg capsule Take 1 capsule (100 mg total) by mouth every 12 (twelve) hours for 10 days 20 capsule 0   • hydrochlorothiazide (HYDRODIURIL) 12.5 mg tablet TAKE 1 TABLET BY MOUTH EVERY DAY IN THE MORNING 90 tablet 1   • levocetirizine (XYZAL) 5 MG tablet Take 1 tablet (5 mg total) by mouth every evening 30 tablet 1     No current facility-administered medications for this visit.     _______________________________________________________________________  Review of Systems   Constitutional:  Negative for chills, diaphoresis, fatigue and fever. HENT:  Positive for congestion, ear pain, sinus pressure and sinus pain. Negative for sore throat. Eyes:  Negative for pain and visual disturbance. Respiratory:  Negative for cough, chest tightness and shortness of breath. Cardiovascular:  Negative for chest pain and palpitations. Gastrointestinal:  Negative for abdominal pain, diarrhea, nausea and vomiting. Genitourinary:  Negative for dysuria and hematuria. Musculoskeletal:  Negative for arthralgias and back pain. Skin:  Negative for color change and rash. Neurological:  Negative for dizziness, seizures, syncope and headaches. All other systems reviewed and are negative. Objective:  Vitals:    12/14/23 1122   BP: 140/82   BP Location: Right arm   Patient Position: Sitting   Cuff Size: Large   Pulse: 82   Temp: 98.4 °F (36.9 °C)   SpO2: 99%   Weight: 111 kg (244 lb)   Height: 5' 4" (1.626 m)     Body mass index is 41.88 kg/m². Physical Exam  Vitals and nursing note reviewed. Constitutional:       General: She is not in acute distress. Appearance: Normal appearance. She is not ill-appearing.    HENT: Head: Normocephalic. Right Ear: External ear normal. Tympanic membrane is bulging. Left Ear: External ear normal. Tympanic membrane is bulging. Nose: Congestion present. Mouth/Throat:      Mouth: Mucous membranes are moist.      Pharynx: No posterior oropharyngeal erythema. Eyes:      Extraocular Movements: Extraocular movements intact. Conjunctiva/sclera: Conjunctivae normal.      Pupils: Pupils are equal, round, and reactive to light. Cardiovascular:      Rate and Rhythm: Normal rate and regular rhythm. Pulses: Normal pulses. Heart sounds: Normal heart sounds. Pulmonary:      Effort: Pulmonary effort is normal. No respiratory distress. Breath sounds: Normal breath sounds. No wheezing. Abdominal:      General: Bowel sounds are normal.      Palpations: Abdomen is soft. Musculoskeletal:         General: No swelling or tenderness. Normal range of motion. Cervical back: Normal range of motion. No tenderness. Right lower leg: No edema. Left lower leg: No edema. Lymphadenopathy:      Cervical: No cervical adenopathy. Skin:     General: Skin is warm and dry. Neurological:      General: No focal deficit present. Mental Status: She is alert and oriented to person, place, and time. Psychiatric:         Mood and Affect: Mood normal.         Behavior: Behavior normal.         Thought Content:  Thought content normal.         Judgment: Judgment normal.

## 2023-12-18 ENCOUNTER — TELEPHONE (OUTPATIENT)
Dept: FAMILY MEDICINE CLINIC | Facility: CLINIC | Age: 63
End: 2023-12-18

## 2023-12-18 DIAGNOSIS — H66.90 ACUTE OTITIS MEDIA, UNSPECIFIED OTITIS MEDIA TYPE: Primary | ICD-10-CM

## 2023-12-18 RX ORDER — PREDNISONE 10 MG/1
TABLET ORAL DAILY
Qty: 30 TABLET | Refills: 0 | Status: SHIPPED | OUTPATIENT
Start: 2023-12-18 | End: 2023-12-27

## 2023-12-18 NOTE — TELEPHONE ENCOUNTER
Spoke with patient- she stated that she was given an ABX for her ears and was instructed to call us if they are not working for her. She is doing so. She stated that her ears are bothering her more now than they have been. She's afraid it's getting worse. She's been very good about keeping them covered and dry. She's been taking them exactly as prescribed.    Please advise, thank you!

## 2023-12-28 ENCOUNTER — TELEPHONE (OUTPATIENT)
Dept: FAMILY MEDICINE CLINIC | Facility: CLINIC | Age: 63
End: 2023-12-28

## 2023-12-28 DIAGNOSIS — H66.90 ACUTE OTITIS MEDIA, UNSPECIFIED OTITIS MEDIA TYPE: Primary | ICD-10-CM

## 2023-12-28 RX ORDER — FLUTICASONE PROPIONATE 50 MCG
1 SPRAY, SUSPENSION (ML) NASAL DAILY
Qty: 15.8 ML | Refills: 1 | Status: SHIPPED | OUTPATIENT
Start: 2023-12-28

## 2023-12-28 RX ORDER — MONTELUKAST SODIUM 10 MG/1
10 TABLET ORAL
Qty: 30 TABLET | Refills: 5 | Status: SHIPPED | OUTPATIENT
Start: 2023-12-28

## 2023-12-28 NOTE — TELEPHONE ENCOUNTER
Patient called in regards to find out if she can take something for her sinus along with the Prednisone that was prescribed for her last week?  Please advise.  Thanks

## 2023-12-28 NOTE — TELEPHONE ENCOUNTER
Patient just wanted to know what can she take otc with prednisone for her sinus, she just wants to make sure. She said she is using mucinex as well

## 2024-01-20 DIAGNOSIS — H66.90 ACUTE OTITIS MEDIA, UNSPECIFIED OTITIS MEDIA TYPE: ICD-10-CM

## 2024-01-22 DIAGNOSIS — H66.90 ACUTE OTITIS MEDIA, UNSPECIFIED OTITIS MEDIA TYPE: ICD-10-CM

## 2024-01-22 RX ORDER — LEVOCETIRIZINE DIHYDROCHLORIDE 5 MG/1
5 TABLET, FILM COATED ORAL EVERY EVENING
Qty: 90 TABLET | Refills: 1 | Status: SHIPPED | OUTPATIENT
Start: 2024-01-22

## 2024-01-22 RX ORDER — FLUTICASONE PROPIONATE 50 MCG
SPRAY, SUSPENSION (ML) NASAL
Qty: 48 ML | Refills: 1 | OUTPATIENT
Start: 2024-01-22

## 2024-01-22 RX ORDER — MONTELUKAST SODIUM 10 MG/1
10 TABLET ORAL
Qty: 90 TABLET | Refills: 2 | Status: SHIPPED | OUTPATIENT
Start: 2024-01-22

## 2024-01-22 RX ORDER — FLUTICASONE PROPIONATE 50 MCG
1 SPRAY, SUSPENSION (ML) NASAL DAILY
Qty: 15.8 ML | Refills: 3 | Status: SHIPPED | OUTPATIENT
Start: 2024-01-22 | End: 2024-04-21

## 2024-01-26 DIAGNOSIS — I10 ESSENTIAL HYPERTENSION: ICD-10-CM

## 2024-01-26 DIAGNOSIS — R60.9 EDEMA, UNSPECIFIED TYPE: ICD-10-CM

## 2024-01-26 RX ORDER — AMLODIPINE AND BENAZEPRIL HYDROCHLORIDE 5; 40 MG/1; MG/1
CAPSULE ORAL
Qty: 90 CAPSULE | Refills: 1 | Status: SHIPPED | OUTPATIENT
Start: 2024-01-26

## 2024-01-26 RX ORDER — HYDROCHLOROTHIAZIDE 12.5 MG/1
TABLET ORAL
Qty: 90 TABLET | Refills: 1 | Status: SHIPPED | OUTPATIENT
Start: 2024-01-26

## 2024-02-01 ENCOUNTER — TELEPHONE (OUTPATIENT)
Dept: FAMILY MEDICINE CLINIC | Facility: CLINIC | Age: 64
End: 2024-02-01

## 2024-02-01 NOTE — TELEPHONE ENCOUNTER
Patient called asking if it is ok if she start the [place] weight loss system in pill form.   Please advise because she is concerned due to her high blood pressure.

## 2024-02-05 DIAGNOSIS — H66.90 ACUTE OTITIS MEDIA, UNSPECIFIED OTITIS MEDIA TYPE: ICD-10-CM

## 2024-02-05 RX ORDER — FLUTICASONE PROPIONATE 50 MCG
SPRAY, SUSPENSION (ML) NASAL
Qty: 48 ML | Refills: 2 | Status: SHIPPED | OUTPATIENT
Start: 2024-02-05

## 2024-02-16 ENCOUNTER — RA CDI HCC (OUTPATIENT)
Dept: OTHER | Facility: HOSPITAL | Age: 64
End: 2024-02-16

## 2024-02-21 PROBLEM — Z12.11 SCREENING FOR COLON CANCER: Status: RESOLVED | Noted: 2020-03-06 | Resolved: 2024-02-21

## 2024-02-21 PROBLEM — Z00.00 WELL ADULT EXAM: Status: RESOLVED | Noted: 2018-02-27 | Resolved: 2024-02-21

## 2024-02-21 PROBLEM — J01.11 ACUTE RECURRENT FRONTAL SINUSITIS: Status: RESOLVED | Noted: 2020-06-12 | Resolved: 2024-02-21

## 2024-02-21 PROBLEM — Z12.39 SCREENING FOR BREAST CANCER: Status: RESOLVED | Noted: 2020-03-06 | Resolved: 2024-02-21

## 2024-02-22 ENCOUNTER — OFFICE VISIT (OUTPATIENT)
Dept: FAMILY MEDICINE CLINIC | Facility: CLINIC | Age: 64
End: 2024-02-22
Payer: COMMERCIAL

## 2024-02-22 VITALS
HEART RATE: 94 BPM | TEMPERATURE: 97.8 F | OXYGEN SATURATION: 98 % | WEIGHT: 248.6 LBS | HEIGHT: 64 IN | SYSTOLIC BLOOD PRESSURE: 148 MMHG | DIASTOLIC BLOOD PRESSURE: 86 MMHG | BODY MASS INDEX: 42.44 KG/M2

## 2024-02-22 DIAGNOSIS — H65.196 OTHER RECURRENT ACUTE NONSUPPURATIVE OTITIS MEDIA OF BOTH EARS: ICD-10-CM

## 2024-02-22 DIAGNOSIS — E66.01 MORBID OBESITY WITH BMI OF 40.0-44.9, ADULT (HCC): ICD-10-CM

## 2024-02-22 DIAGNOSIS — Z00.00 HEALTHCARE MAINTENANCE: ICD-10-CM

## 2024-02-22 DIAGNOSIS — Z00.00 ANNUAL PHYSICAL EXAM: Primary | ICD-10-CM

## 2024-02-22 DIAGNOSIS — E66.01 CLASS 3 SEVERE OBESITY DUE TO EXCESS CALORIES WITHOUT SERIOUS COMORBIDITY WITH BODY MASS INDEX (BMI) OF 40.0 TO 44.9 IN ADULT (HCC): ICD-10-CM

## 2024-02-22 DIAGNOSIS — Z12.31 SCREENING MAMMOGRAM, ENCOUNTER FOR: ICD-10-CM

## 2024-02-22 DIAGNOSIS — Z12.11 SCREEN FOR COLON CANCER: ICD-10-CM

## 2024-02-22 PROCEDURE — 99396 PREV VISIT EST AGE 40-64: CPT

## 2024-02-22 RX ORDER — AZITHROMYCIN 250 MG/1
TABLET, FILM COATED ORAL DAILY
Qty: 6 TABLET | Refills: 0 | Status: SHIPPED | OUTPATIENT
Start: 2024-02-22 | End: 2024-02-27

## 2024-02-22 NOTE — ASSESSMENT & PLAN NOTE
Referral placed to nutrition, will reach out if she would like to see weight management.   Suggest my fitness Pal.  Reduce daily calories by 300 calories a day.  Increase activity to 150 minutes a week.  Suggest lean protein high fiber diet.    Eats small portions every 3 hours. 5-9 fruits and vegetables a day.  Do not drink sugary drinks.  Have fasting lab work, will call with results.

## 2024-02-22 NOTE — PATIENT INSTRUCTIONS
Problem List Items Addressed This Visit          Other    Class 3 severe obesity in adult (HCC)     Referral placed to nutrition, will reach out if she would like to see weight management.   Suggest my fitness Pal.  Reduce daily calories by 300 calories a day.  Increase activity to 150 minutes a week.  Suggest lean protein high fiber diet.    Eats small portions every 3 hours. 5-9 fruits and vegetables a day.  Do not drink sugary drinks.  Have fasting lab work, will call with results.          Relevant Orders    CBC and differential    Comprehensive metabolic panel    Hemoglobin A1C    Lipid panel    TSH, 3rd generation with Free T4 reflex    Vitamin D 25 hydroxy    Ambulatory Referral to Nutrition Services    Morbid obesity with BMI of 40.0-44.9, adult (HCC)     Referral placed to nutrition, will reach out if she would like to see weight management.   Suggest my fitness Pal.  Reduce daily calories by 300 calories a day.  Increase activity to 150 minutes a week.  Suggest lean protein high fiber diet.    Eats small portions every 3 hours. 5-9 fruits and vegetables a day.  Do not drink sugary drinks.  Have fasting lab work, will call with results.          Relevant Orders    Ambulatory Referral to Nutrition Services     Other Visit Diagnoses       Annual physical exam    -  Primary    Recommend 20 minutes of exercise most days, 5 vegetables daily, have fasting lab work, follow up in 1 year.    Screen for colon cancer        Will do fit test, will call with results    Relevant Orders    Occult Blood, Fecal Immunochemical    Screening mammogram, encounter for        mammo ordered    Relevant Orders    Mammo screening bilateral w 3d & cad    Healthcare maintenance        will refer to gyn for annual    Relevant Orders    Ambulatory Referral to Obstetrics / Gynecology    Other recurrent acute nonsuppurative otitis media of both ears        will treat with z allan and nasal sprays, recommend humidification, follow up with  ENT.    Relevant Medications    azithromycin (Zithromax) 250 mg tablet    Other Relevant Orders    Ambulatory Referral to Otolaryngology          Wellness Visit for Adults   AMBULATORY CARE:   A wellness visit  is when you see your healthcare provider to get screened for health problems. Your healthcare provider will also give you advice on how to stay healthy. Write down your questions so you remember to ask them. Ask your healthcare provider how often you should have a wellness visit.  What happens at a wellness visit:  Your healthcare provider will ask about your health, and your family history of health problems. This includes high blood pressure, heart disease, and cancer. He or she will ask if you have symptoms that concern you, if you smoke, and about your mood. You may also be asked about your intake of medicines, supplements, food, and alcohol. Any of the following may be done:  Your weight  will be checked. Your height may also be checked so your body mass index (BMI) can be calculated. Your BMI shows if you are at a healthy weight.    Your blood pressure  and heart rate will be checked. Your temperature may also be checked.    Blood and urine tests  may be done. Blood tests may be done to check your cholesterol levels. Abnormal cholesterol levels increase your risk for heart disease and stroke. You may also need a blood or urine test to check for diabetes if you are at increased risk. Urine tests may be done to look for signs of an infection or kidney disease.    A physical exam  includes checking your heartbeat and lungs with a stethoscope. Your healthcare provider may also check your skin to look for sun damage.    Screening tests  may be recommended. A screening test is done to check for diseases that may not cause symptoms. The screening tests you may need depend on your age, gender, family history, and lifestyle habits. For example, colorectal screening may be recommended if you are 50 years old or  older.    Screening tests you need if you are a woman:   A Pap smear  is used to screen for cervical cancer. Pap smears are usually done every 3 to 5 years depending on your age. You may need them more often if you have had abnormal Pap smear test results in the past. Ask your healthcare provider how often you should have a Pap smear.    A mammogram  is an x-ray of your breasts to screen for breast cancer. Experts recommend mammograms every 2 years starting at age 50 years. You may need a mammogram at age 49 years or younger if you have an increased risk for breast cancer. Talk to your healthcare provider about when you should start having mammograms and how often you need them.    Vaccines you may need:   Get an influenza vaccine  every year. The influenza vaccine protects you from the flu. Several types of viruses cause the flu. The viruses change over time, so new vaccines are made each year.    Get a tetanus-diphtheria (Td) booster vaccine  every 10 years. This vaccine protects you against tetanus and diphtheria. Tetanus is a severe infection that may cause painful muscle spasms and lockjaw. Diphtheria is a severe bacterial infection that causes a thick covering in the back of your mouth and throat.    Get a human papillomavirus (HPV) vaccine  if you are female and aged 19 to 26 or male 19 to 21 and never received it. This vaccine protects you from HPV infection. HPV is the most common infection spread by sexual contact. HPV may also cause vaginal, penile, and anal cancers.    Get a pneumococcal vaccine  if you are aged 65 years or older. The pneumococcal vaccine is an injection given to protect you from pneumococcal disease. Pneumococcal disease is an infection caused by pneumococcal bacteria. The infection may cause pneumonia, meningitis, or an ear infection.    Get a shingles vaccine  if you are 60 or older, even if you have had shingles before. The shingles vaccine is an injection to protect you from the  varicella-zoster virus. This is the same virus that causes chickenpox. Shingles is a painful rash that develops in people who had chickenpox or have been exposed to the virus.    How to eat healthy:  My Plate is a model for planning healthy meals. It shows the types and amounts of foods that should go on your plate. Fruits and vegetables make up about half of your plate, and grains and protein make up the other half. A serving of dairy is included on the side of your plate. The amount of calories and serving sizes you need depends on your age, gender, weight, and height. Examples of healthy foods are listed below:  Eat a variety of vegetables  such as dark green, red, and orange vegetables. You can also include canned vegetables low in sodium (salt) and frozen vegetables without added butter or sauces.    Eat a variety of fresh fruits , canned fruit in 100% juice, frozen fruit, and dried fruit.    Include whole grains.  At least half of the grains you eat should be whole grains. Examples include whole-wheat bread, wheat pasta, brown rice, and whole-grain cereals such as oatmeal.    Eat a variety of protein foods such as seafood (fish and shellfish), lean meat, and poultry without skin (turkey and chicken). Examples of lean meats include pork leg, shoulder, or tenderloin, and beef round, sirloin, tenderloin, and extra lean ground beef. Other protein foods include eggs and egg substitutes, beans, peas, soy products, nuts, and seeds.    Choose low-fat dairy products such as skim or 1% milk or low-fat yogurt, cheese, and cottage cheese.    Limit unhealthy fats  such as butter, hard margarine, and shortening.       Exercise:  Exercise at least 30 minutes per day on most days of the week. Some examples of exercise include walking, biking, dancing, and swimming. You can also fit in more physical activity by taking the stairs instead of the elevator or parking farther away from stores. Include muscle strengthening  activities 2 days each week. Regular exercise provides many health benefits. It helps you manage your weight, and decreases your risk for type 2 diabetes, heart disease, stroke, and high blood pressure. Exercise can also help improve your mood. Ask your healthcare provider about the best exercise plan for you.       General health and safety guidelines:   Do not smoke.  Nicotine and other chemicals in cigarettes and cigars can cause lung damage. Ask your healthcare provider for information if you currently smoke and need help to quit. E-cigarettes or smokeless tobacco still contain nicotine. Talk to your healthcare provider before you use these products.    Limit alcohol.  A drink of alcohol is 12 ounces of beer, 5 ounces of wine, or 1½ ounces of liquor.    Lose weight, if needed.  Being overweight increases your risk of certain health conditions. These include heart disease, high blood pressure, type 2 diabetes, and certain types of cancer.    Protect your skin.  Do not sunbathe or use tanning beds. Use sunscreen with a SPF 15 or higher. Apply sunscreen at least 15 minutes before you go outside. Reapply sunscreen every 2 hours. Wear protective clothing, hats, and sunglasses when you are outside.    Drive safely.  Always wear your seatbelt. Make sure everyone in your car wears a seatbelt. A seatbelt can save your life if you are in an accident. Do not use your cell phone when you are driving. This could distract you and cause an accident. Pull over if you need to make a call or send a text message.    Practice safe sex.  Use latex condoms if are sexually active and have more than one partner. Your healthcare provider may recommend screening tests for sexually transmitted infections (STIs).    Wear helmets, lifejackets, and protective gear.  Always wear a helmet when you ride a bike or motorcycle, go skiing, or play sports that could cause a head injury. Wear protective equipment when you play sports. Wear a  adryan when you are on a boat or doing water sports.    © Copyright [employer] 2023 Information is for End User's use only and may not be sold, redistributed or otherwise used for commercial purposes.  The above information is an  only. It is not intended as medical advice for individual conditions or treatments. Talk to your doctor, nurse or pharmacist before following any medical regimen to see if it is safe and effective for you.

## 2024-02-22 NOTE — PROGRESS NOTES
ADULT ANNUAL PHYSICAL  [facility] [address] [facility]    NAME: Mihaela Hedrick  AGE: 63 y.o. SEX: female  : 1960     DATE: 2024     Assessment and Plan:     Problem List Items Addressed This Visit        Other    Class 3 severe obesity in adult (HCC)     Referral placed to nutrition, will reach out if she would like to see weight management.   Suggest my fitness Pal.  Reduce daily calories by 300 calories a day.  Increase activity to 150 minutes a week.  Suggest lean protein high fiber diet.    Eats small portions every 3 hours. 5-9 fruits and vegetables a day.  Do not drink sugary drinks.  Have fasting lab work, will call with results.          Relevant Orders    CBC and differential    Comprehensive metabolic panel    Hemoglobin A1C    Lipid panel    TSH, 3rd generation with Free T4 reflex    Vitamin D 25 hydroxy    Ambulatory Referral to Nutrition Services    Morbid obesity with BMI of 40.0-44.9, adult (HCC)     Referral placed to nutrition, will reach out if she would like to see weight management.   Suggest my fitness Pal.  Reduce daily calories by 300 calories a day.  Increase activity to 150 minutes a week.  Suggest lean protein high fiber diet.    Eats small portions every 3 hours. 5-9 fruits and vegetables a day.  Do not drink sugary drinks.  Have fasting lab work, will call with results.          Relevant Orders    Ambulatory Referral to Nutrition Services   Other Visit Diagnoses     Annual physical exam    -  Primary    Recommend 20 minutes of exercise most days, 5 vegetables daily, have fasting lab work, follow up in 1 year.    Screen for colon cancer        Will do fit test, will call with results    Relevant Orders    Occult Blood, Fecal Immunochemical    Screening mammogram, encounter for        mammo ordered    Relevant Orders    Mammo screening bilateral w 3d & cad    Healthcare maintenance        will refer to gyn for  annual    Relevant Orders    Ambulatory Referral to Obstetrics / Gynecology    Other recurrent acute nonsuppurative otitis media of both ears        will treat with z allan and nasal sprays, recommend humidification, follow up with ENT.    Relevant Medications    azithromycin (Zithromax) 250 mg tablet    Other Relevant Orders    Ambulatory Referral to Otolaryngology          Immunizations and preventive care screenings were discussed with patient today. Appropriate education was printed on patient's after visit summary.    Counseling:  Alcohol/drug use: discussed moderation in alcohol intake, the recommendations for healthy alcohol use, and avoidance of illicit drug use.  Dental Health: discussed importance of regular tooth brushing, flossing, and dental visits.  Injury prevention: discussed safety/seat belts, safety helmets, smoke detectors, carbon dioxide detectors, and smoking near bedding or upholstery.  Sexual health: discussed sexually transmitted diseases, partner selection, use of condoms, avoidance of unintended pregnancy, and contraceptive alternatives.  Exercise: the importance of regular exercise/physical activity was discussed. Recommend exercise 3-5 times per week for at least 30 minutes.       Depression Screening and Follow-up Plan: Patient was screened for depression during today's encounter. They screened negative with a PHQ-2 score of 0.        Return in about 1 year (around 2/22/2025).     Chief Complaint:     Chief Complaint   Patient presents with   • Annual Exam      History of Present Illness:     Adult Annual Physical   Patient here for a comprehensive physical exam. The patient reports problems - weight gain .    Diet and Physical Activity  Diet/Nutrition: poor diet.   Exercise: no formal exercise.      Depression Screening  PHQ-2/9 Depression Screening    Little interest or pleasure in doing things: 0 - not at all  Feeling down, depressed, or hopeless: 0 - not at all  PHQ-2 Score: 0  PHQ-2  Interpretation: Negative depression screen       General Health  Sleep: gets 7-8 hours of sleep on average and snores loudly.   Hearing: normal - bilateral.  Vision: most recent eye exam <1 year ago and wears glasses.   Dental: no dental visits for >1 year, brushes teeth twice daily, and flosses teeth occasionally.       /GYN Health  Follows with gynecology? no   Patient is: postmenopausal  Last menstrual period: years ago  Contraceptive method: menopause.    Advanced Care Planning  Do you have an advanced directive? no  Do you have a durable medical power of ? no  ACP document given to the patient? no     Review of Systems:     Review of Systems   Constitutional:  Negative for chills, diaphoresis and fever.   HENT:  Positive for ear pain. Negative for congestion, rhinorrhea, sinus pressure, sinus pain and sore throat.    Eyes:  Negative for pain and visual disturbance.   Respiratory:  Negative for cough, chest tightness, shortness of breath and wheezing.    Cardiovascular:  Negative for chest pain and palpitations.   Gastrointestinal:  Negative for abdominal pain, constipation, diarrhea, nausea and vomiting.   Genitourinary:  Negative for dysuria, frequency and hematuria.   Musculoskeletal:  Negative for arthralgias, back pain and myalgias.   Skin:  Negative for color change and rash.   Neurological:  Negative for dizziness, seizures, syncope, light-headedness and headaches.   Psychiatric/Behavioral:  Negative for dysphoric mood and sleep disturbance. The patient is not nervous/anxious.    All other systems reviewed and are negative.     Past Medical History:     Past Medical History:   Diagnosis Date   • Essential hypertension     Resolved:4/21/2017      Past Surgical History:     Past Surgical History:   Procedure Laterality Date   • DILATION AND CURETTAGE OF UTERUS     • NO PAST SURGERIES     • TUBAL LIGATION        Social History:     Social History     Socioeconomic History   • Marital status:  /Civil Union     Spouse name: None   • Number of children: None   • Years of education: None   • Highest education level: None   Occupational History   • Occupation: Self Employed   Tobacco Use   • Smoking status: Former   • Smokeless tobacco: Never   Vaping Use   • Vaping status: Never Used   Substance and Sexual Activity   • Alcohol use: No   • Drug use: No   • Sexual activity: None   Other Topics Concern   • None   Social History Narrative    Employed    Lives alone with help available    Never uses seat belt    Self Employed     Social Determinants of Health     Financial Resource Strain: Not on file   Food Insecurity: Not on file   Transportation Needs: Not on file   Physical Activity: Not on file   Stress: Not on file   Social Connections: Not on file   Intimate Partner Violence: Not on file   Housing Stability: Not on file      Family History:     Family History   Problem Relation Age of Onset   • Cancer Father         malignant neoplasm   • Diabetes Cousin    • Asthma Other         acute   • Hypertension Other    • Cancer Other         malignant neoplasm   • Cancer Other         malignant neoplasm   • Thyroid disease Family       Current Medications:     Current Outpatient Medications   Medication Sig Dispense Refill   • azithromycin (Zithromax) 250 mg tablet Take 2 tablets (500 mg total) by mouth daily for 1 day, THEN 1 tablet (250 mg total) daily for 4 days. 6 tablet 0   • fluticasone (FLONASE) 50 mcg/act nasal spray SPRAY 1 SPRAY INTO EACH NOSTRIL EVERY DAY 48 mL 2   • hydroCHLOROthiazide 12.5 mg tablet TAKE 1 TABLET BY MOUTH EVERY DAY IN THE MORNING 90 tablet 1   • levocetirizine (XYZAL) 5 MG tablet TAKE 1 TABLET BY MOUTH EVERY DAY IN THE EVENING 90 tablet 1   • montelukast (SINGULAIR) 10 mg tablet TAKE 1 TABLET BY MOUTH DAILY AT BEDTIME 90 tablet 2   • amLODIPine-benazepril (LOTREL) 5-40 MG per capsule TAKE 1 CAPSULE BY MOUTH EVERY DAY 90 capsule 1     No current facility-administered medications  "for this visit.      Allergies:     No Known Allergies   Physical Exam:     /86 (BP Location: Left arm, Patient Position: Sitting)   Pulse 94   Temp 97.8 °F (36.6 °C)   Ht 5' 4\" (1.626 m)   Wt 113 kg (248 lb 9.6 oz)   SpO2 98%   BMI 42.67 kg/m²     Physical Exam  Vitals and nursing note reviewed.   Constitutional:       General: She is not in acute distress.     Appearance: Normal appearance. She is well-developed. She is not ill-appearing.   HENT:      Head: Normocephalic and atraumatic.      Right Ear: Tympanic membrane is erythematous and bulging.      Nose: Nose normal. No congestion.      Mouth/Throat:      Mouth: Mucous membranes are moist.      Pharynx: No posterior oropharyngeal erythema.   Eyes:      Extraocular Movements: Extraocular movements intact.      Conjunctiva/sclera: Conjunctivae normal.      Pupils: Pupils are equal, round, and reactive to light.   Cardiovascular:      Rate and Rhythm: Normal rate and regular rhythm.      Heart sounds: No murmur heard.  Pulmonary:      Effort: Pulmonary effort is normal. No respiratory distress.      Breath sounds: Normal breath sounds.   Abdominal:      Palpations: Abdomen is soft.      Tenderness: There is no abdominal tenderness.   Musculoskeletal:         General: No swelling.      Cervical back: Normal range of motion and neck supple.      Right lower leg: No edema.      Left lower leg: No edema.   Lymphadenopathy:      Cervical: No cervical adenopathy.   Skin:     General: Skin is warm and dry.      Capillary Refill: Capillary refill takes less than 2 seconds.   Neurological:      General: No focal deficit present.      Mental Status: She is alert.   Psychiatric:         Mood and Affect: Mood normal.         Behavior: Behavior normal.          CLEMENTINE Dia  [facility] [address] [facility]    "

## 2024-02-24 ENCOUNTER — HOSPITAL ENCOUNTER (EMERGENCY)
Facility: HOSPITAL | Age: 64
Discharge: HOME | End: 2024-02-24
Attending: EMERGENCY MEDICINE | Admitting: EMERGENCY MEDICINE
Payer: COMMERCIAL

## 2024-02-24 VITALS
DIASTOLIC BLOOD PRESSURE: 94 MMHG | HEART RATE: 107 BPM | RESPIRATION RATE: 18 BRPM | SYSTOLIC BLOOD PRESSURE: 173 MMHG | TEMPERATURE: 98.8 F | OXYGEN SATURATION: 97 %

## 2024-02-24 DIAGNOSIS — R09.81 NASAL CONGESTION: Primary | ICD-10-CM

## 2024-02-24 DIAGNOSIS — R42 DIZZINESS: ICD-10-CM

## 2024-02-24 PROCEDURE — 99284 EMERGENCY DEPT VISIT MOD MDM: CPT | Performed by: EMERGENCY MEDICINE

## 2024-02-24 PROCEDURE — 93005 ELECTROCARDIOGRAM TRACING: CPT

## 2024-02-24 PROCEDURE — 99283 EMERGENCY DEPT VISIT LOW MDM: CPT

## 2024-02-24 NOTE — ED PROVIDER NOTES
History  Chief Complaint   Patient presents with    Nasal Congestion     Pt c/o nasal pressure and congestion x 1 month      One month of nasal congestion and sinus pressure. Unalleviated by zpak started yesterday. Saw ENT who advised steroid nasal spray but she does not like using it and therefore does not use it. Associated nonvertiginous dizziness, ongoing x 1 month. No syncope. No sob or cp or focal weakness. No near syncope. No fever, chills, headache, neck pain or stiffness.         Prior to Admission Medications   Prescriptions Last Dose Informant Patient Reported? Taking?   amLODIPine-benazepril (LOTREL) 5-40 MG per capsule   No No   Sig: TAKE 1 CAPSULE BY MOUTH EVERY DAY   azithromycin (Zithromax) 250 mg tablet   No No   Sig: Take 2 tablets (500 mg total) by mouth daily for 1 day, THEN 1 tablet (250 mg total) daily for 4 days.   fluticasone (FLONASE) 50 mcg/act nasal spray   No No   Sig: SPRAY 1 SPRAY INTO EACH NOSTRIL EVERY DAY   hydroCHLOROthiazide 12.5 mg tablet   No No   Sig: TAKE 1 TABLET BY MOUTH EVERY DAY IN THE MORNING   levocetirizine (XYZAL) 5 MG tablet   No No   Sig: TAKE 1 TABLET BY MOUTH EVERY DAY IN THE EVENING   montelukast (SINGULAIR) 10 mg tablet   No No   Sig: TAKE 1 TABLET BY MOUTH DAILY AT BEDTIME      Facility-Administered Medications: None       Past Medical History:   Diagnosis Date    Essential hypertension     Resolved:4/21/2017       Past Surgical History:   Procedure Laterality Date    DILATION AND CURETTAGE OF UTERUS      NO PAST SURGERIES      TUBAL LIGATION         Family History   Problem Relation Age of Onset    Cancer Father         malignant neoplasm    Diabetes Cousin     Asthma Other         acute    Hypertension Other     Cancer Other         malignant neoplasm    Cancer Other         malignant neoplasm    Thyroid disease Family      I have reviewed and agree with the history as documented.    E-Cigarette/Vaping    E-Cigarette Use Never User      E-Cigarette/Vaping  Substances    Nicotine No     THC No     CBD No     Flavoring No     Other No     Unknown No      Social History     Tobacco Use    Smoking status: Former    Smokeless tobacco: Never   Vaping Use    Vaping status: Never Used   Substance Use Topics    Alcohol use: No    Drug use: No       Review of Systems   Constitutional:  Negative for chills and fever.   HENT:  Positive for congestion, sinus pressure and sinus pain. Negative for ear discharge, ear pain, facial swelling, hearing loss, sore throat, trouble swallowing and voice change.    Respiratory:  Negative for cough, chest tightness and shortness of breath.    Neurological:  Positive for dizziness. Negative for tremors, seizures, syncope, facial asymmetry, speech difficulty, weakness, light-headedness, numbness and headaches.       Physical Exam  Physical Exam  Vitals and nursing note reviewed.   Constitutional:       General: She is not in acute distress.     Appearance: Normal appearance. She is well-developed. She is not ill-appearing, toxic-appearing or diaphoretic.   HENT:      Head: Normocephalic and atraumatic.      Nose: Nose normal. No rhinorrhea.      Mouth/Throat:      Mouth: Mucous membranes are moist.      Pharynx: Oropharynx is clear. No oropharyngeal exudate.   Eyes:      General:         Right eye: No discharge.         Left eye: No discharge.      Conjunctiva/sclera: Conjunctivae normal.      Pupils: Pupils are equal, round, and reactive to light.   Neck:      Vascular: No JVD.   Cardiovascular:      Rate and Rhythm: Normal rate.      Pulses: Normal pulses.      Heart sounds: Normal heart sounds. No murmur heard.  Pulmonary:      Effort: Pulmonary effort is normal. No respiratory distress.      Breath sounds: No stridor. No wheezing, rhonchi or rales.   Chest:      Chest wall: No tenderness.   Musculoskeletal:         General: No tenderness or deformity. Normal range of motion.      Cervical back: Normal range of motion and neck supple.    Skin:     General: Skin is warm and dry.      Capillary Refill: Capillary refill takes less than 2 seconds.   Neurological:      General: No focal deficit present.      Mental Status: She is alert and oriented to person, place, and time.      Cranial Nerves: No cranial nerve deficit.      Sensory: No sensory deficit.      Motor: No weakness or abnormal muscle tone.      Coordination: Coordination normal.      Gait: Gait normal.         Vital Signs  ED Triage Vitals [02/24/24 1708]   Temperature Pulse Respirations Blood Pressure SpO2   98.8 °F (37.1 °C) (!) 107 18 (!) 173/94 97 %      Temp Source Heart Rate Source Patient Position - Orthostatic VS BP Location FiO2 (%)   Oral Monitor Sitting Left arm --      Pain Score       --           Vitals:    02/24/24 1708   BP: (!) 173/94   Pulse: (!) 107   Patient Position - Orthostatic VS: Sitting         Visual Acuity      ED Medications  Medications - No data to display    Diagnostic Studies  Results Reviewed       None                   No orders to display              Procedures  ECG 12 Lead Documentation Only    Date/Time: 2/24/2024 5:33 PM    Performed by: Ney Triana MD  Authorized by: Ney Triana MD    Indications / Diagnosis:  Dizziness  ECG reviewed by me, the ED Provider: yes    Patient location:  ED  Previous ECG:     Previous ECG:  Compared to current    Comparison ECG info:  8 dec 2023    Similarity:  No change  Interpretation:     Interpretation: non-specific    Rate:     ECG rate:  99    ECG rate assessment: normal    Rhythm:     Rhythm: sinus rhythm    Other findings:     Other findings: prolonged qTc interval    Comments:      Nonspecific st changes. Prolonged qt interval. No TRINO or STD.            ED Course                               SBIRT 20yo+      Flowsheet Row Most Recent Value   Initial Alcohol Screen: US AUDIT-C     1. How often do you have a drink containing alcohol? 0 Filed at: 02/24/2024 1708   2. How many drinks containing alcohol do  you have on a typical day you are drinking?  0 Filed at: 02/24/2024 1708   3b. FEMALE Any Age, or MALE 65+: How often do you have 4 or more drinks on one occassion? 0 Filed at: 02/24/2024 1708   Audit-C Score 0 Filed at: 02/24/2024 1708   UBALDO: How many times in the past year have you...    Used an illegal drug or used a prescription medication for non-medical reasons? Never Filed at: 02/24/2024 1708                      Medical Decision Making  Amount and/or Complexity of Data Reviewed  ECG/medicine tests: ordered and independent interpretation performed. Decision-making details documented in ED Course.             Disposition  Final diagnoses:   Nasal congestion   Dizziness     Time reflects when diagnosis was documented in both MDM as applicable and the Disposition within this note       Time User Action Codes Description Comment    2/24/2024  5:31 PM Ney Triana [R09.81] Nasal congestion     2/24/2024  5:31 PM Ney Triana Add [R42] Dizziness           ED Disposition       ED Disposition   Discharge    Condition   Stable    Date/Time   Sat Feb 24, 2024 1731    Comment   Mihaela Hedrick discharge to home/self care.                   Follow-up Information       Follow up With Specialties Details Why Contact Info Additional Information    [facility] Emergency Department Emergency Medicine  If symptoms worsen [address] [facility] [id number] [phone] [facility] Emergency Department, [address], [place], [address]            Discharge Medication List as of 2/24/2024  5:32 PM        CONTINUE these medications which have NOT CHANGED    Details   amLODIPine-benazepril (LOTREL) 5-40 MG per capsule TAKE 1 CAPSULE BY MOUTH EVERY DAY, Normal      azithromycin (Zithromax) 250 mg tablet Multiple Dosages:Starting Thu 2/22/2024, Until Thu 2/22/2024, THEN Starting Fri 2/23/2024, Until Mon 2/26/2024Take 2 tablets (500 mg total) by mouth daily  for 1 day, THEN 1 tablet (250 mg total) daily for 4 days., Normal      fluticasone (FLONASE) 50 mcg/act nasal spray SPRAY 1 SPRAY INTO EACH NOSTRIL EVERY DAY, Normal      hydroCHLOROthiazide 12.5 mg tablet TAKE 1 TABLET BY MOUTH EVERY DAY IN THE MORNING, Normal      levocetirizine (XYZAL) 5 MG tablet TAKE 1 TABLET BY MOUTH EVERY DAY IN THE EVENING, Starting Mon 1/22/2024, Normal      montelukast (SINGULAIR) 10 mg tablet TAKE 1 TABLET BY MOUTH DAILY AT BEDTIME, Starting Mon 1/22/2024, Normal             No discharge procedures on file.    PDMP Review         Value Time User    PDMP Reviewed  Yes 6/24/2022  1:53 PM CLEMENTINE Klein            ED Provider  Electronically Signed by             Ney Triana MD  02/25/24 7921

## 2024-02-25 LAB
ATRIAL RATE: 99 BPM
P AXIS: 67 DEGREES
PR INTERVAL: 140 MS
QRS AXIS: 84 DEGREES
QRSD INTERVAL: 84 MS
QT INTERVAL: 384 MS
QTC INTERVAL: 492 MS
T WAVE AXIS: 16 DEGREES
VENTRICULAR RATE: 99 BPM

## 2024-02-25 PROCEDURE — 93010 ELECTROCARDIOGRAM REPORT: CPT | Performed by: INTERNAL MEDICINE

## 2024-04-06 DIAGNOSIS — H66.90 ACUTE OTITIS MEDIA, UNSPECIFIED OTITIS MEDIA TYPE: ICD-10-CM

## 2024-04-08 RX ORDER — FLUTICASONE PROPIONATE 50 MCG
SPRAY, SUSPENSION (ML) NASAL
Qty: 48 ML | Refills: 3 | Status: SHIPPED | OUTPATIENT
Start: 2024-04-08

## 2024-04-25 LAB
25(OH)D3 SERPL-MCNC: 32 NG/ML (ref 30–100)
ALBUMIN SERPL-MCNC: 4.2 G/DL (ref 3.6–5.1)
ALBUMIN/GLOB SERPL: 1.4 (CALC) (ref 1–2.5)
ALP SERPL-CCNC: 68 U/L (ref 37–153)
ALT SERPL-CCNC: 22 U/L (ref 6–29)
AST SERPL-CCNC: 17 U/L (ref 10–35)
BASOPHILS # BLD AUTO: 42 CELLS/UL (ref 0–200)
BASOPHILS NFR BLD AUTO: 0.8 %
BILIRUB SERPL-MCNC: 0.5 MG/DL (ref 0.2–1.2)
BUN SERPL-MCNC: 10 MG/DL (ref 7–25)
BUN/CREAT SERPL: NORMAL (CALC) (ref 6–22)
CALCIUM SERPL-MCNC: 9.9 MG/DL (ref 8.6–10.4)
CHLORIDE SERPL-SCNC: 103 MMOL/L (ref 98–110)
CHOLEST SERPL-MCNC: 227 MG/DL
CHOLEST/HDLC SERPL: 3.9 (CALC)
CO2 SERPL-SCNC: 30 MMOL/L (ref 20–32)
CREAT SERPL-MCNC: 0.69 MG/DL (ref 0.5–1.05)
EOSINOPHIL # BLD AUTO: 62 CELLS/UL (ref 15–500)
EOSINOPHIL NFR BLD AUTO: 1.2 %
ERYTHROCYTE [DISTWIDTH] IN BLOOD BY AUTOMATED COUNT: 13.2 % (ref 11–15)
GFR/BSA.PRED SERPLBLD CYS-BASED-ARV: 97 ML/MIN/1.73M2
GLOBULIN SER CALC-MCNC: 3.1 G/DL (CALC) (ref 1.9–3.7)
GLUCOSE SERPL-MCNC: 96 MG/DL (ref 65–99)
HBA1C MFR BLD: 6.2 % OF TOTAL HGB
HCT VFR BLD AUTO: 40.2 % (ref 35–45)
HDLC SERPL-MCNC: 58 MG/DL
HGB BLD-MCNC: 13.4 G/DL (ref 11.7–15.5)
LDLC SERPL CALC-MCNC: 148 MG/DL (CALC)
LYMPHOCYTES # BLD AUTO: 2142 CELLS/UL (ref 850–3900)
LYMPHOCYTES NFR BLD AUTO: 41.2 %
MCH RBC QN AUTO: 30.3 PG (ref 27–33)
MCHC RBC AUTO-ENTMCNC: 33.3 G/DL (ref 32–36)
MCV RBC AUTO: 91 FL (ref 80–100)
MONOCYTES # BLD AUTO: 426 CELLS/UL (ref 200–950)
MONOCYTES NFR BLD AUTO: 8.2 %
NEUTROPHILS # BLD AUTO: 2527 CELLS/UL (ref 1500–7800)
NEUTROPHILS NFR BLD AUTO: 48.6 %
NONHDLC SERPL-MCNC: 169 MG/DL (CALC)
PLATELET # BLD AUTO: 259 THOUSAND/UL (ref 140–400)
PMV BLD REES-ECKER: 10.7 FL (ref 7.5–12.5)
POTASSIUM SERPL-SCNC: 4.3 MMOL/L (ref 3.5–5.3)
PROT SERPL-MCNC: 7.3 G/DL (ref 6.1–8.1)
RBC # BLD AUTO: 4.42 MILLION/UL (ref 3.8–5.1)
SODIUM SERPL-SCNC: 142 MMOL/L (ref 135–146)
TRIGL SERPL-MCNC: 100 MG/DL
TSH SERPL-ACNC: 1.59 MIU/L (ref 0.4–4.5)
WBC # BLD AUTO: 5.2 THOUSAND/UL (ref 3.8–10.8)

## 2024-05-01 NOTE — RESULT NOTES
Verified Results  VAS LOWER LIMB VENOUS DUPLEX STUDY, UNILATERAL/LIMITED 24Apr2017 09:53AM Pablo Gutierrez Order Number: [id number]    - Patient Instructions: To schedule this appointment, please contact Central Scheduling at [phone]  Test Name Result Flag Reference   VAS LOWER LIMB VENOUS DUPLEX STUDY, UNILATERAL/LIMITED (Report)     [facility] REPORT   CLINICAL:   Indications: Limb Pain [M79 609]  Patient reports left knee pain for the last three weeks with a palpable lump x   1 week  Reports acute onset of left foot swelling during this time  The patient has no history of DVT or malignancy  FINDINGS:      Left   Valve     FV Dist Reflux             CONCLUSION:      Impression:   RIGHT LOWER LIMB LIMITED: NORMAL   Evaluation shows no evidence of thrombus in the common femoral vein  Doppler evaluation shows a normal response to augmentation maneuvers  LEFT LOWER LIMB: ABNORMAL   No evidence of acute or chronic deep vein thrombosis   No evidence of superficial thrombophlebitis noted  Venous reflux is visualized in the distal femoral vein  Popliteal, posterior tibial and peroneal arterial Doppler waveforms are   triphasic  There is a well defined hypoechoic non-vascularized structure noted in the   popliteal fossa consistent with a Bakers cyst       SIGNATURE:   Electronically Signed by: Anna Curry on 2017-04-24 01:08:02 PM       Plan  Obesity    · Belviq 10 MG Oral Tablet;  Take 1 tablet twice daily Writer called patient to inform them of 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     Writer reviewed:  Brief background of what EOGO is.  The rate of reported cases and deaths associated with EOGO (0.24% @ 2 years, 2.06% @ 5 years, 273 injuries, and 14 deaths).  Their pump does not need to be replaced, per VAD team, FDA, and industry. There is no indication to stop implanting Heartmate 3 pumps.  The VAD team monitors for EOGO in clinic visits, assessing VAD parameters and with routine testing. It is a treatable condition and we have successfully managed it.    Low flows are the most common sign of EOGO. Patient does not report any persistent changes in flow. Reviewed standard instructions that patient should assess and document VAD parameters once daily. They should notify VAD team for persistent flow changes of 2 or more and/or new/unexpected low flow alarms, or if they generally feel unwell.   The [facility] is also sending a letter to review this information. We will keep them informed of any new information or changes.   They should page the on-call VAD Coordinator with future questions or concerns.     Patient verbalized understanding of the above.

## 2024-08-28 ENCOUNTER — TELEPHONE (OUTPATIENT)
Age: 64
End: 2024-08-28

## 2024-08-28 DIAGNOSIS — R39.198 URINARY DYSFUNCTION: Primary | ICD-10-CM

## 2024-08-28 NOTE — TELEPHONE ENCOUNTER
Pt advised. She requesting to come into office tomorrow for urine sample. Pt placed on nursing schedule.

## 2024-08-29 ENCOUNTER — CLINICAL SUPPORT (OUTPATIENT)
Dept: FAMILY MEDICINE CLINIC | Facility: CLINIC | Age: 64
End: 2024-08-29
Payer: COMMERCIAL

## 2024-08-29 DIAGNOSIS — R30.0 DYSURIA: Primary | ICD-10-CM

## 2024-08-29 LAB
SL AMB  POCT GLUCOSE, UA: NORMAL
SL AMB LEUKOCYTE ESTERASE,UA: NORMAL
SL AMB POCT BILIRUBIN,UA: NORMAL
SL AMB POCT BLOOD,UA: NORMAL
SL AMB POCT CLARITY,UA: CLEAR
SL AMB POCT COLOR,UA: YELLOW
SL AMB POCT KETONES,UA: NORMAL
SL AMB POCT NITRITE,UA: NORMAL
SL AMB POCT PH,UA: 6
SL AMB POCT SPECIFIC GRAVITY,UA: 1.01
SL AMB POCT URINE PROTEIN: NORMAL
SL AMB POCT UROBILINOGEN: NORMAL

## 2024-08-29 PROCEDURE — 81002 URINALYSIS NONAUTO W/O SCOPE: CPT

## 2024-08-29 PROCEDURE — 87086 URINE CULTURE/COLONY COUNT: CPT

## 2024-08-30 DIAGNOSIS — N39.0 URINARY TRACT INFECTION WITH HEMATURIA, SITE UNSPECIFIED: Primary | ICD-10-CM

## 2024-08-30 DIAGNOSIS — R31.9 URINARY TRACT INFECTION WITH HEMATURIA, SITE UNSPECIFIED: Primary | ICD-10-CM

## 2024-08-30 LAB — BACTERIA UR CULT: NORMAL

## 2024-08-30 RX ORDER — NITROFURANTOIN 25; 75 MG/1; MG/1
100 CAPSULE ORAL 2 TIMES DAILY
Qty: 10 CAPSULE | Refills: 0 | Status: SHIPPED | OUTPATIENT
Start: 2024-08-30 | End: 2024-09-04

## 2024-09-08 DIAGNOSIS — I10 ESSENTIAL HYPERTENSION: ICD-10-CM

## 2024-09-08 DIAGNOSIS — R60.9 EDEMA, UNSPECIFIED TYPE: ICD-10-CM

## 2024-09-08 RX ORDER — AMLODIPINE AND BENAZEPRIL HYDROCHLORIDE 5; 40 MG/1; MG/1
CAPSULE ORAL
Qty: 90 CAPSULE | Refills: 1 | Status: SHIPPED | OUTPATIENT
Start: 2024-09-08

## 2024-09-08 RX ORDER — HYDROCHLOROTHIAZIDE 12.5 MG/1
TABLET ORAL
Qty: 90 TABLET | Refills: 1 | Status: SHIPPED | OUTPATIENT
Start: 2024-09-08

## 2024-11-13 ENCOUNTER — TELEPHONE (OUTPATIENT)
Age: 64
End: 2024-11-13

## 2024-11-13 NOTE — TELEPHONE ENCOUNTER
Pt called in stating that she has been having sinus issues over the weekend. Pt reports some pressure, and that she has used Flonase, along with other OTC medication, but nothing has worked.     Pt is inquiring if there is something that Gloria could send for her?    If so, to please send to the following pharmacy:  [facility]/pharmacy #[id number] - [facility] [address] - [address] [phone]     Please advise & call pt back with update on her inquiry/request. Thank you!    Mihaela Hedrick: [phone]

## 2024-11-14 NOTE — TELEPHONE ENCOUNTER
Patient called to check on the status of her message she left yesterday. Patient was made aware Gloria would like for her to come in for an appointment to be seen. Patient states she has to call back to schedule the appointment as she is getting ready to go to work.

## 2024-11-15 NOTE — TELEPHONE ENCOUNTER
Patient is aware that Gloria wants to see her for appointment, patient will give us a call of her availability,

## 2024-12-10 ENCOUNTER — TELEPHONE (OUTPATIENT)
Age: 64
End: 2024-12-10

## 2024-12-10 NOTE — TELEPHONE ENCOUNTER
Patient  wants to know if okay to take OTC Tylenol Sinus Severe? Please advise.    Mihaela: [phone]

## 2024-12-11 ENCOUNTER — TELEMEDICINE (OUTPATIENT)
Dept: FAMILY MEDICINE CLINIC | Facility: CLINIC | Age: 64
End: 2024-12-11
Payer: COMMERCIAL

## 2024-12-11 DIAGNOSIS — J01.00 ACUTE NON-RECURRENT MAXILLARY SINUSITIS: Primary | ICD-10-CM

## 2024-12-11 PROCEDURE — 99213 OFFICE O/P EST LOW 20 MIN: CPT | Performed by: NURSE PRACTITIONER

## 2024-12-11 NOTE — PROGRESS NOTES
Virtual Regular Visit  Name: Mihaela Hedrick      : 1960      MRN: [id number]  Encounter Provider: CLEMENTINE Delarosa  Encounter Date: 2024   Encounter department: [facility] [address] [place] [address]      Verification of patient location:  Patient is located at Home in the following state in which I hold an active license [place] :  Assessment & Plan  Acute non-recurrent maxillary sinusitis  Advised increase hydration, steam, humidified/moist air, saline rinses twice daily, avoid irritants when possible, warm compresses to face.  Encouraged to restart Xyzal as previously prescribed.  Will initiate Augmentin.  To restart Flonase as discussed.    Orders:    amoxicillin-clavulanate (AUGMENTIN) 875-125 mg per tablet; Take 1 tablet by mouth every 12 (twelve) hours for 10 days        Encounter provider CLEMENTINE Delarosa    The patient was identified by name and date of birth. Miheala Hedrick was informed that this is a telemedicine visit and that the visit is being conducted through the Epic Embedded platform. She agrees to proceed..  My office door was closed. No one else was in the room.  She acknowledged consent and understanding of privacy and security of the video platform. The patient has agreed to participate and understands they can discontinue the visit at any time.    Patient is aware this is a billable service.     History of Present Illness     Patient presents via virtual visit for congestion, post-nasal drip, facial pressure.  Symptoms started a week ago.  Covid test 3 days ago was negative.  History of seasonal allergies, but is not taking Xyzal as previously prescribed.  Has been using Flonase on as-needed basis.  Patient notes postnasal drip, throat irritation.  Denies fever, cough, body aches.      Review of Systems   Constitutional:  Negative for chills, fatigue and fever.   HENT:  Positive for congestion, postnasal drip and sinus pressure. Negative for ear pain, sinus  pain, sore throat and trouble swallowing.    Eyes:  Negative for photophobia and visual disturbance.   Respiratory:  Negative for cough and shortness of breath.    Cardiovascular:  Negative for chest pain.   Gastrointestinal:  Negative for nausea and vomiting.   Genitourinary:  Negative for decreased urine volume.   Musculoskeletal:  Negative for arthralgias and myalgias.   Skin:  Negative for color change and rash.   Neurological:  Negative for dizziness and headaches.   Hematological:  Negative for adenopathy.   Psychiatric/Behavioral:  Negative for confusion.        Objective   There were no vitals taken for this visit.    Physical Exam  Constitutional:       General: She is not in acute distress.     Appearance: Normal appearance. She is not ill-appearing.   HENT:      Head: Normocephalic.      Right Ear: External ear normal.      Left Ear: External ear normal.      Nose: Congestion present.      Mouth/Throat:      Mouth: Mucous membranes are moist.   Pulmonary:      Effort: Pulmonary effort is normal. No respiratory distress.   Musculoskeletal:         General: Normal range of motion.      Cervical back: Normal range of motion.   Skin:     Coloration: Skin is not pale.   Neurological:      General: No focal deficit present.      Mental Status: She is alert and oriented to person, place, and time.   Psychiatric:         Mood and Affect: Mood normal.         Behavior: Behavior normal.         Visit Time  Total Visit Duration: 13 minutes

## 2025-01-03 ENCOUNTER — TELEPHONE (OUTPATIENT)
Age: 65
End: 2025-01-03

## 2025-01-03 NOTE — TELEPHONE ENCOUNTER
Pt called to f/u and see if Dr Stahl can confirm if pt is able to use the cooling gel for her hemorrhoid since instructions on box say check with your doctor if you have high blood pressure.   Pt requested a call back.    Please advise, thank you.

## 2025-01-03 NOTE — TELEPHONE ENCOUNTER
Phone call from patient stating she has a hemorrhoid and bought OTC [facility] cooling gel to apply topically. It states on the back of box to check with your doctor to use if you have high blood pressure. Patient just wants to make sure it is ok to use.  Please advise and call patient . Thank you.

## 2025-01-24 ENCOUNTER — TELEPHONE (OUTPATIENT)
Age: 65
End: 2025-01-24

## 2025-01-24 NOTE — TELEPHONE ENCOUNTER
Patient states she is experience some sinus pressure and would like to know what OTC medication would be safe that would not interfere with her blood pressure medication.    She has Rapid Release Extra Strength Tylenol.  Would this be safe to take?    If not, what OTC medication does the provider recommend.    Patient is awaiting a return call.

## 2025-02-17 DIAGNOSIS — R60.9 EDEMA, UNSPECIFIED TYPE: ICD-10-CM

## 2025-02-18 RX ORDER — HYDROCHLOROTHIAZIDE 12.5 MG/1
12.5 TABLET ORAL EVERY MORNING
Qty: 90 TABLET | Refills: 1 | Status: SHIPPED | OUTPATIENT
Start: 2025-02-18

## 2025-02-20 ENCOUNTER — OFFICE VISIT (OUTPATIENT)
Age: 65
End: 2025-02-20
Payer: COMMERCIAL

## 2025-02-20 VITALS
BODY MASS INDEX: 35.85 KG/M2 | SYSTOLIC BLOOD PRESSURE: 128 MMHG | HEIGHT: 64 IN | DIASTOLIC BLOOD PRESSURE: 80 MMHG | WEIGHT: 210 LBS

## 2025-02-20 DIAGNOSIS — Z12.31 SCREENING MAMMOGRAM FOR BREAST CANCER: ICD-10-CM

## 2025-02-20 DIAGNOSIS — Z01.419 ENCOUNTER FOR ANNUAL ROUTINE GYNECOLOGICAL EXAMINATION: Primary | ICD-10-CM

## 2025-02-20 DIAGNOSIS — Z78.0 ASYMPTOMATIC POSTMENOPAUSAL STATUS: ICD-10-CM

## 2025-02-20 DIAGNOSIS — Z12.11 SCREEN FOR COLON CANCER: ICD-10-CM

## 2025-02-20 PROBLEM — E66.9 OBESITY: Status: ACTIVE | Noted: 2017-04-21

## 2025-02-20 PROBLEM — M25.571 CHRONIC PAIN OF RIGHT ANKLE: Status: RESOLVED | Noted: 2022-06-24 | Resolved: 2025-02-20

## 2025-02-20 PROBLEM — E66.813 CLASS 3 SEVERE OBESITY IN ADULT (HCC): Status: RESOLVED | Noted: 2020-03-06 | Resolved: 2025-02-20

## 2025-02-20 PROBLEM — E66.01 CLASS 3 SEVERE OBESITY IN ADULT (HCC): Status: RESOLVED | Noted: 2020-03-06 | Resolved: 2025-02-20

## 2025-02-20 PROBLEM — G89.29 CHRONIC PAIN OF RIGHT ANKLE: Status: RESOLVED | Noted: 2022-06-24 | Resolved: 2025-02-20

## 2025-02-20 PROBLEM — U07.1 COVID-19: Status: RESOLVED | Noted: 2021-07-30 | Resolved: 2025-02-20

## 2025-02-20 PROBLEM — E66.01 MORBID OBESITY WITH BMI OF 40.0-44.9, ADULT (HCC): Status: RESOLVED | Noted: 2022-06-24 | Resolved: 2025-02-20

## 2025-02-20 PROBLEM — M79.672 LEFT FOOT PAIN: Status: RESOLVED | Noted: 2020-09-08 | Resolved: 2025-02-20

## 2025-02-20 PROBLEM — M54.50 ACUTE BILATERAL LOW BACK PAIN: Status: RESOLVED | Noted: 2021-09-22 | Resolved: 2025-02-20

## 2025-02-20 PROBLEM — M81.0 AGE RELATED OSTEOPOROSIS: Status: ACTIVE | Noted: 2017-04-21

## 2025-02-20 PROBLEM — R10.9 FLANK PAIN: Status: RESOLVED | Noted: 2022-06-24 | Resolved: 2025-02-20

## 2025-02-20 PROBLEM — E66.9 OBESITY: Status: RESOLVED | Noted: 2017-04-21 | Resolved: 2025-02-20

## 2025-02-20 PROCEDURE — G0145 SCR C/V CYTO,THINLAYER,RESCR: HCPCS | Performed by: NURSE PRACTITIONER

## 2025-02-20 PROCEDURE — 99386 PREV VISIT NEW AGE 40-64: CPT | Performed by: NURSE PRACTITIONER

## 2025-02-20 PROCEDURE — G0476 HPV COMBO ASSAY CA SCREEN: HCPCS | Performed by: NURSE PRACTITIONER

## 2025-02-20 NOTE — PATIENT INSTRUCTIONS
Patient Education     Lowering Your Risk of Breast Cancer   About this topic   Breast cancer is a serious illness. Breast cancer is when abnormal cells grow and divide more quickly in your breast. These cells form a growth or tumor. The abnormal cells may enter nearby tissue and spread to other parts of the body. It is the type of cancer most often seen in women. Men can have breast cancer, but it is a rare condition.  General   Some things in your life may increase your risk of breast cancer. You may not be able to change some of these. Others you can control.  You are more likely to get breast cancer if you:  Have a mother, sister, or daughter who has had breast cancer  Have used hormones for menopause for more than 5 years  Have had radiation therapy to the breast or chest in the past  Are overweight or do not exercise  Had your first menstrual period before you were 11 years old  Went through menopause after age 55  Have never been pregnant or had your first child after age 35  Have had breast cancer before  Drink alcohol in any form  Have dense breasts  Are older in age  There is no certain way to prevent breast cancer. There are things you can do to lower your chances of having breast cancer.  Keep a healthy weight. Lose weight if you are overweight. Being overweight raises your chances of having breast cancer.  Eat a healthy diet to maintain a healthy weight, such as more fruits, vegetables, and lean cuts of meat. Decrease the amount of saturated fat in your diet.  Exercise. Being active helps you keep a healthy weight.  Limit your alcohol intake or do not drink alcohol. The more alcohol you drink, the higher your risk.  Do not smoke cigarettes. Smoking can increase your risk of many types of cancer.  Breastfeed your baby. This may help protect you. The longer you breastfeed, the more protection you have.  Talk with your doctor about:  Limiting or stopping hormone therapy.  Taking certain drugs to prevent  breast cancer. For women at high risk of having breast cancer, there are a few drugs that may lower your risk.  Surgery to prevent you from having breast cancer if you are very high risk.  When do I need to call the doctor?   Changes in your breasts  A lump or area in your breast that feels different  Discharge from your nipple  Skin on your breast is dimpled or indented  You have questions or concerns about your breasts  Helpful tips   Talk to your doctor about the best kind of breast cancer screening for you.  If you want to do self breast exams, have your doctor show you the right way to do them.  Tell your doctor of any abnormal finding.  Last Reviewed Date   2021-10-04  Consumer Information Use and Disclaimer   This generalized information is a limited summary of diagnosis, treatment, and/or medication information. It is not meant to be comprehensive and should be used as a tool to help the user understand and/or assess potential diagnostic and treatment options. It does NOT include all information about conditions, treatments, medications, side effects, or risks that may apply to a specific patient. It is not intended to be medical advice or a substitute for the medical advice, diagnosis, or treatment of a health care provider based on the health care provider's examination and assessment of a patient’s specific and unique circumstances. Patients must speak with a health care provider for complete information about their health, medical questions, and treatment options, including any risks or benefits regarding use of medications. This information does not endorse any treatments or medications as safe, effective, or approved for treating a specific patient. Up[employer], Inc[employer] and its affiliates disclaim any warranty or liability relating to this information or the use thereof. The use of this information is governed by the Terms of Use, available at  https://www.woltersSiTuneuwer.com/en/know/clinical-effectiveness-terms   Copyright   Copyright © 2024 [employer], [employer] and its affiliates and/or licensors. All rights reserved.

## 2025-02-20 NOTE — PROGRESS NOTES
Name: Mihaela Hedrick      : 1960      MRN: [id number]  Encounter Provider: CLEMENTINE Loco  Encounter Date: 2025   Encounter department: [facility]  :  Assessment & Plan  Encounter for annual routine gynecological examination  Calcium 1200-1500mg (in divided doses-max 600 mg at one time) + 600-1000 IU Vit D daily.   Exercise 150-300 minutes per week minimum including weight bearing exercises.   Pap with high risk HPV Q 5 years, if normal.    Call your insurance company to verify coverage prior to completing any ordered tests.   Annual mammogram ordered and monthly breast self exam recommended.    Kegels 20 times twice daily.   Vaginal moisturizers such as coconut oil as needed.   Return to office in one year or sooner, if needed.     Orders:    Liquid-based pap, screening    Asymptomatic postmenopausal status         Screen for colon cancer    Orders:    Ambulatory referral to Gastroenterology; Future    Screening mammogram for breast cancer    Orders:    Mammo screening bilateral w 3d and cad; Future        History of Present Illness     Postmenopausal   Sexually active   History of abnormal pap: has not been to Gyn in 10 years   Pap with cotest today  Self breast exam yes   40+ Mammogram ordered today   45+ Colon Cancer screening: ordered today     Any issues or concerns: has skin that hangs off of her labia that she wants checked     Hereditary Cancer Screening  FH Breast/Ovarian cancer: Paternal Aunt Breast cancer   FH Uterine cancer: none  FH Colon cancer: none    Past Medical History:  No date: Allergic  No date: Essential hypertension      Comment:  Resolved:2017      Past Surgical History:  No date: DILATION AND CURETTAGE OF UTERUS  No date: NO PAST SURGERIES  No date: TUBAL LIGATION      Social History    Tobacco Use      Smoking status: Former      Smokeless tobacco: Never    Vaping Use      Vaping status: Never Used    Alcohol use:  No    Drug use: No    OB History   Gravida3  Para2  Term0  Preterm0  AB1  Living2    SAB1  IAB0  Ectopic0  Multiple0  Live Births0       Comment: Vaginal x 2      Current Outpatient Medications:   ·  amLODIPine-benazepril (LOTREL) 5-40 MG per capsule, TAKE 1 CAPSULE BY MOUTH EVERY DAY, Disp: 90 capsule, Rfl: 1  ·  fluticasone (FLONASE) 50 mcg/act nasal spray, SPRAY 1 SPRAY INTO EACH NOSTRIL EVERY DAY, Disp: 48 mL, Rfl: 3  ·  hydroCHLOROthiazide 12.5 mg tablet, TAKE 1 TABLET BY MOUTH EVERY DAY IN THE MORNING, Disp: 90 tablet, Rfl: 1  ·  levocetirizine (XYZAL) 5 MG tablet, TAKE 1 TABLET BY MOUTH EVERY DAY IN THE EVENING (Patient not taking: Reported on 12/11/2024), Disp: 90 tablet, Rfl: 1  ·  montelukast (SINGULAIR) 10 mg tablet, TAKE 1 TABLET BY MOUTH DAILY AT BEDTIME (Patient not taking: Reported on 12/11/2024), Disp: 90 tablet, Rfl: 2       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 She hasn't been to a GYN in over 10 yrs. Mammo and Colonoscopy ordered today. She is newly sexually active after 15 years. She declines an STD screen today. She reports vaginal discharge that has now resolved.She denies pelvic pain or dyspareunia. She reports excess skin on her left labia which is not bothersome. I discussed labiaplasty but she states she will hold off on this.      Provider-has 12 children which she cares for  6 grands, oldest is 26      Review of Systems   Constitutional:  Negative for chills, fatigue, fever and unexpected weight change.   Respiratory:  Negative for shortness of breath.    Gastrointestinal:  Negative for anal bleeding, blood in stool, constipation and diarrhea.   Genitourinary:  Negative for difficulty urinating, dysuria and hematuria.   Neurological:  Negative for weakness.   Psychiatric/Behavioral:  Negative for agitation, behavioral problems and confusion.           Objective   /80 (BP Location: Left arm, Patient Position: Sitting, Cuff Size: Standard)   Ht 5'  "4\" (1.626 m)   Wt 95.3 kg (210 lb)   BMI 36.05 kg/m²      Physical Exam  Constitutional:       General: She is not in acute distress.     Appearance: She is well-developed.   HENT:      Head: Normocephalic.   Pulmonary:      Effort: Pulmonary effort is normal.   Chest:   Breasts:     Breasts are symmetrical.      Right: No inverted nipple, mass, nipple discharge, skin change or tenderness.      Left: No inverted nipple, mass, nipple discharge, skin change or tenderness.   Abdominal:      Palpations: Abdomen is soft.   Genitourinary:     Exam position: Supine.      Labia:         Right: No rash, tenderness, lesion or injury.         Left: No rash, tenderness, lesion or injury.       Vagina: No signs of injury and foreign body. No vaginal discharge, erythema or tenderness.      Cervix: No cervical motion tenderness, discharge or friability.      Uterus: Not deviated, not enlarged, not fixed and not tender.       Adnexa:         Right: No mass, tenderness or fullness.          Left: No mass, tenderness or fullness.        Comments: Left labial elongation which is nonpainful, benign appearing  Musculoskeletal:      Cervical back: Normal range of motion and neck supple.           "

## 2025-02-21 LAB
HPV HR 12 DNA CVX QL NAA+PROBE: NEGATIVE
HPV16 DNA CVX QL NAA+PROBE: NEGATIVE
HPV18 DNA CVX QL NAA+PROBE: NEGATIVE

## 2025-02-24 ENCOUNTER — TELEPHONE (OUTPATIENT)
Age: 65
End: 2025-02-24

## 2025-02-24 NOTE — TELEPHONE ENCOUNTER
Patient called office to request to review HPV results. Advised that once reviewed by provider she would be called with results.

## 2025-02-28 LAB
LAB AP GYN PRIMARY INTERPRETATION: NORMAL
Lab: NORMAL

## 2025-03-03 ENCOUNTER — RESULTS FOLLOW-UP (OUTPATIENT)
Age: 65
End: 2025-03-03

## 2025-03-12 DIAGNOSIS — I10 ESSENTIAL HYPERTENSION: ICD-10-CM

## 2025-03-12 RX ORDER — AMLODIPINE AND BENAZEPRIL HYDROCHLORIDE 5; 40 MG/1; MG/1
1 CAPSULE ORAL DAILY
Qty: 90 CAPSULE | Refills: 1 | Status: SHIPPED | OUTPATIENT
Start: 2025-03-12

## 2025-04-17 ENCOUNTER — TELEMEDICINE (OUTPATIENT)
Dept: FAMILY MEDICINE CLINIC | Facility: CLINIC | Age: 65
End: 2025-04-17
Payer: COMMERCIAL

## 2025-04-17 DIAGNOSIS — I10 ESSENTIAL HYPERTENSION: ICD-10-CM

## 2025-04-17 DIAGNOSIS — J32.9 SINUSITIS, UNSPECIFIED CHRONICITY, UNSPECIFIED LOCATION: Primary | ICD-10-CM

## 2025-04-17 PROCEDURE — 98006 SYNCH AUDIO-VIDEO EST MOD 30: CPT | Performed by: NURSE PRACTITIONER

## 2025-04-17 RX ORDER — DOXYCYCLINE 100 MG/1
100 CAPSULE ORAL EVERY 12 HOURS SCHEDULED
Qty: 14 CAPSULE | Refills: 0 | Status: SHIPPED | OUTPATIENT
Start: 2025-04-17 | End: 2025-04-24

## 2025-04-17 NOTE — PROGRESS NOTES
Virtual Regular VisitName: Mihaela Hedrick      : 1960      MRN: [id number]  Encounter Provider: CLEMENTINE Mane  Encounter Date: 2025   Encounter department: [facility] [address]  :  Assessment & Plan  Sinusitis, unspecified chronicity, unspecified location  To begin doxycycline 1 tablet every 12 hours for 7 days and Flonase 1 spray every 12 hours.  Counseled on beginning sinus rinses, increasing fluid intake, and using a cool-mist humidifier at nighttime.  Follow-up if symptoms do not improve in 1 week or sooner if symptoms worsen.  Orders:    doxycycline hyclate (VIBRAMYCIN) 100 mg capsule; Take 1 capsule (100 mg total) by mouth every 12 (twelve) hours for 7 days    Essential hypertension  To continue Lotrel and hydrochlorothiazide.  Counseled on avoiding medications containing nasal decongestants as this can elevate blood pressure.             History of Present Illness     Mihaela presents reporting nasal congestion for the past 3 days.  She is also having fatigue, sinus pressure, headaches, and sinus pain.  She has been treating her symptoms with over-the-counter Tylenol Cold and sinus medication without improvement.  Denies fever, chills, shortness of breath, or wheezing.      Review of Systems   Constitutional:  Positive for fatigue.   HENT:  Positive for congestion, sinus pressure and sinus pain.    Eyes: Negative.    Respiratory: Negative.     Cardiovascular: Negative.    Gastrointestinal: Negative.    Endocrine: Negative.    Genitourinary: Negative.    Musculoskeletal: Negative.    Skin: Negative.    Allergic/Immunologic: Negative.    Neurological:  Positive for headaches.   Hematological: Negative.    Psychiatric/Behavioral: Negative.         Objective   There were no vitals taken for this visit.    Physical Exam  Vitals reviewed.   Constitutional:       General: She is not in acute distress.     Appearance: Normal appearance. She is not ill-appearing,  toxic-appearing or diaphoretic.   HENT:      Head: Normocephalic and atraumatic.      Nose:      Right Sinus: Maxillary sinus tenderness present.      Left Sinus: Maxillary sinus tenderness present.   Eyes:      Conjunctiva/sclera: Conjunctivae normal.   Pulmonary:      Effort: Pulmonary effort is normal.   Musculoskeletal:      Cervical back: Neck supple.   Neurological:      General: No focal deficit present.      Mental Status: She is alert and oriented to person, place, and time.   Psychiatric:         Mood and Affect: Mood normal.         Behavior: Behavior normal.         Thought Content: Thought content normal.         Judgment: Judgment normal.         Administrative Statements   Encounter provider CLEMENTINE Mane    The Patient is located at Home and in the following state in which I hold an active license [place].    The patient was identified by name and date of birth. Mihaela Hedrick was informed that this is a telemedicine visit and that the visit is being conducted through the Epic Embedded platform. She agrees to proceed..  My office door was closed. No one else was in the room.  She acknowledged consent and understanding of privacy and security of the video platform. The patient has agreed to participate and understands they can discontinue the visit at any time.    I have spent a total time of 12 minutes in caring for this patient on the day of the visit/encounter including Prognosis, Risks and benefits of tx options, Instructions for management, Patient and family education, Importance of tx compliance, Risk factor reductions, Impressions, Counseling / Coordination of care, Documenting in the medical record, Reviewing/placing orders in the medical record (including tests, medications, and/or procedures), and Obtaining or reviewing history  , not including the time spent for establishing the audio/video connection.

## 2025-05-19 ENCOUNTER — TELEPHONE (OUTPATIENT)
Age: 65
End: 2025-05-19

## 2025-05-19 ENCOUNTER — OFFICE VISIT (OUTPATIENT)
Dept: FAMILY MEDICINE CLINIC | Facility: CLINIC | Age: 65
End: 2025-05-19
Payer: COMMERCIAL

## 2025-05-19 VITALS
HEART RATE: 76 BPM | BODY MASS INDEX: 34.01 KG/M2 | OXYGEN SATURATION: 98 % | SYSTOLIC BLOOD PRESSURE: 134 MMHG | HEIGHT: 64 IN | WEIGHT: 199.2 LBS | RESPIRATION RATE: 18 BRPM | DIASTOLIC BLOOD PRESSURE: 74 MMHG

## 2025-05-19 DIAGNOSIS — N30.01 ACUTE CYSTITIS WITH HEMATURIA: Primary | ICD-10-CM

## 2025-05-19 DIAGNOSIS — R31.9 HEMATURIA, UNSPECIFIED TYPE: ICD-10-CM

## 2025-05-19 DIAGNOSIS — I10 ESSENTIAL HYPERTENSION: ICD-10-CM

## 2025-05-19 LAB
SL AMB  POCT GLUCOSE, UA: ABNORMAL
SL AMB LEUKOCYTE ESTERASE,UA: ABNORMAL
SL AMB POCT BILIRUBIN,UA: ABNORMAL
SL AMB POCT BLOOD,UA: ABNORMAL
SL AMB POCT CLARITY,UA: ABNORMAL
SL AMB POCT COLOR,UA: YELLOW
SL AMB POCT KETONES,UA: ABNORMAL
SL AMB POCT NITRITE,UA: ABNORMAL
SL AMB POCT PH,UA: 6
SL AMB POCT SPECIFIC GRAVITY,UA: 1.02
SL AMB POCT URINE PROTEIN: ABNORMAL
SL AMB POCT UROBILINOGEN: 3.5

## 2025-05-19 PROCEDURE — 87186 SC STD MICRODIL/AGAR DIL: CPT | Performed by: NURSE PRACTITIONER

## 2025-05-19 PROCEDURE — 87077 CULTURE AEROBIC IDENTIFY: CPT | Performed by: NURSE PRACTITIONER

## 2025-05-19 PROCEDURE — 87086 URINE CULTURE/COLONY COUNT: CPT | Performed by: NURSE PRACTITIONER

## 2025-05-19 PROCEDURE — 81002 URINALYSIS NONAUTO W/O SCOPE: CPT | Performed by: NURSE PRACTITIONER

## 2025-05-19 PROCEDURE — 99214 OFFICE O/P EST MOD 30 MIN: CPT | Performed by: NURSE PRACTITIONER

## 2025-05-19 RX ORDER — SULFAMETHOXAZOLE AND TRIMETHOPRIM 800; 160 MG/1; MG/1
1 TABLET ORAL 2 TIMES DAILY
Qty: 6 TABLET | Refills: 0 | Status: SHIPPED | OUTPATIENT
Start: 2025-05-19 | End: 2025-05-22

## 2025-05-19 NOTE — PATIENT INSTRUCTIONS
To begin a probiotic.  May use Over the counter products such as Lume or rephresh.  Avoid douching or using any vaginal products containing fragrances or color.

## 2025-05-19 NOTE — PROGRESS NOTES
"Name: Mihaela Hedrick      : 1960      MRN: [id number]  Encounter Provider: CLEMENTINE Mane  Encounter Date: 2025   Encounter department: [facility] [address]  :  Assessment & Plan  Acute cystitis with hematuria  Urine dip abnormal.  Will send out for culture.  To begin Bactrim 1 tablet every 12 hours for 3 days.  Counseled on staying well-hydrated and avoiding bladder irritants.  To follow-up if no improvement in 1 week or sooner if symptoms worsen.  Orders:    sulfamethoxazole-trimethoprim (BACTRIM DS) 800-160 mg per tablet; Take 1 tablet by mouth 2 (two) times a day for 3 days    Essential hypertension  Blood pressure stable, to continue current antihypertensive regimen.  Counseled on the importance of maintaining a healthy weight and consuming a low-sodium diet.        Hematuria, unspecified type    Orders:    POCT urine dip    Urine culture           History of Present Illness   Mihaela presents reporting pelvic pressure that started 1 week ago.  She is also having urinary urgency and frequency.  Denies fever, chills, or flank pain.  She treated her symptoms with cranberry juice and cranberry tablets which has provided some relief.      Back Pain      Review of Systems   Constitutional: Negative.    HENT: Negative.     Eyes: Negative.    Respiratory: Negative.     Cardiovascular: Negative.    Gastrointestinal: Negative.    Endocrine: Negative.    Genitourinary:  Positive for urgency.        Pelvic pressure   Musculoskeletal:  Positive for back pain.   Skin: Negative.    Allergic/Immunologic: Negative.    Neurological: Negative.    Hematological: Negative.    Psychiatric/Behavioral: Negative.         Objective   /74 (BP Location: Left arm, Patient Position: Sitting)   Pulse 76   Resp 18   Ht 5' 4\" (1.626 m)   Wt 90.4 kg (199 lb 3.2 oz)   SpO2 98%   BMI 34.19 kg/m²      Physical Exam  Vitals and nursing note reviewed.   Constitutional:       General: " She is not in acute distress.     Appearance: Normal appearance. She is well-developed. She is not ill-appearing, toxic-appearing or diaphoretic.   HENT:      Head: Normocephalic and atraumatic.     Eyes:      Conjunctiva/sclera: Conjunctivae normal.       Cardiovascular:      Rate and Rhythm: Normal rate and regular rhythm.      Heart sounds: Normal heart sounds. No murmur heard.  Pulmonary:      Effort: Pulmonary effort is normal. No respiratory distress.      Breath sounds: Normal breath sounds. No wheezing or rales.   Chest:      Chest wall: No tenderness.   Abdominal:      General: There is no distension.      Palpations: There is no mass.      Tenderness: There is no abdominal tenderness. There is no right CVA tenderness, left CVA tenderness, guarding or rebound.      Hernia: No hernia is present.     Musculoskeletal:      Cervical back: Neck supple.     Skin:     General: Skin is warm and dry.      Capillary Refill: Capillary refill takes less than 2 seconds.     Neurological:      General: No focal deficit present.      Mental Status: She is alert and oriented to person, place, and time.     Psychiatric:         Mood and Affect: Mood normal.         Behavior: Behavior normal.         Thought Content: Thought content normal.         Judgment: Judgment normal.

## 2025-05-19 NOTE — TELEPHONE ENCOUNTER
Joe call from patient stating she just left the office and realized she never got a copy of her discharge summary. Patient forgot what the provider said she can use OTC. I read verbatim the following to patient from office visit note.    To begin a probiotic.  May use Over the counter products such as Lume or rephresh.  Avoid douching or using any vaginal products containing fragrances or color.         Patient has no further questions.

## 2025-05-21 ENCOUNTER — TELEPHONE (OUTPATIENT)
Age: 65
End: 2025-05-21

## 2025-05-21 LAB — BACTERIA UR CULT: ABNORMAL

## 2025-05-21 NOTE — TELEPHONE ENCOUNTER
Patient states she sees her Urine Culture results and is worried. She requests a call back today to review.

## 2025-05-22 ENCOUNTER — DOCUMENTATION (OUTPATIENT)
Dept: FAMILY MEDICINE CLINIC | Facility: CLINIC | Age: 65
End: 2025-05-22

## 2025-05-22 ENCOUNTER — RESULTS FOLLOW-UP (OUTPATIENT)
Dept: FAMILY MEDICINE CLINIC | Facility: CLINIC | Age: 65
End: 2025-05-22

## 2025-05-22 NOTE — TELEPHONE ENCOUNTER
Pt called in following up on message she left earlier. States she hasn't received any update.     Reviewed chart & informed pt, message has been forwarded to Gloria and awaiting response.     Pt understood. Pt then reports she is taking the bactrim, but missed one day b/c it made her nauseous, still taking it and has 1 remaining.     Pt inquires if she can do probiotics through eating yogurt or drink? Or should it be by pill form? States it was never specified on which she should take?    Please advise & call pt back with update. Thank you!    Mihaela: [phone]

## 2025-05-22 NOTE — TELEPHONE ENCOUNTER
Patient returned call, relayed message. Patient states she did miss 1 day and was going to take an extra da. Patient also asking if she can take the probiotic through yogurt/drink or if it has to be in pill form. Patient requests call back to advise.

## 2025-05-23 NOTE — TELEPHONE ENCOUNTER
Patient is aware, she also ask about the bactrim, she missed a day but continue the next day. Does she need to repeat the course?

## 2025-08-20 ENCOUNTER — TELEPHONE (OUTPATIENT)
Age: 65
End: 2025-08-20

## 2025-08-21 DIAGNOSIS — Z11.59 NEED FOR HEPATITIS C SCREENING TEST: ICD-10-CM

## 2025-08-21 DIAGNOSIS — Z11.4 SCREENING FOR HIV (HUMAN IMMUNODEFICIENCY VIRUS): ICD-10-CM

## 2025-08-21 DIAGNOSIS — I10 ESSENTIAL HYPERTENSION: Primary | ICD-10-CM
